# Patient Record
Sex: FEMALE | Race: WHITE | NOT HISPANIC OR LATINO | Employment: PART TIME | URBAN - METROPOLITAN AREA
[De-identification: names, ages, dates, MRNs, and addresses within clinical notes are randomized per-mention and may not be internally consistent; named-entity substitution may affect disease eponyms.]

---

## 2023-08-24 ENCOUNTER — HOSPITAL ENCOUNTER (OUTPATIENT)
Dept: DATA CONVERSION | Facility: HOSPITAL | Age: 23
Discharge: HOME | End: 2023-08-24
Payer: COMMERCIAL

## 2023-08-24 DIAGNOSIS — I10 ESSENTIAL (PRIMARY) HYPERTENSION: ICD-10-CM

## 2023-08-24 DIAGNOSIS — E78.5 HYPERLIPIDEMIA, UNSPECIFIED: ICD-10-CM

## 2023-08-24 DIAGNOSIS — F41.9 ANXIETY DISORDER, UNSPECIFIED: ICD-10-CM

## 2023-08-24 DIAGNOSIS — Z00.00 ENCOUNTER FOR GENERAL ADULT MEDICAL EXAMINATION WITHOUT ABNORMAL FINDINGS: ICD-10-CM

## 2023-08-24 LAB
25(OH)D3 SERPL-MCNC: 22 NG/ML (ref 31–100)
ALBUMIN SERPL-MCNC: 4.4 GM/DL (ref 3.5–5)
ALBUMIN/GLOB SERPL: 1.6 RATIO (ref 1.5–3)
ALP BLD-CCNC: 100 U/L (ref 35–125)
ALT SERPL-CCNC: 16 U/L (ref 5–40)
ANION GAP SERPL CALCULATED.3IONS-SCNC: 13 MMOL/L (ref 0–19)
APPEARANCE PLAS: CLEAR
AST SERPL-CCNC: 19 U/L (ref 5–40)
BASOPHILS # BLD AUTO: 0.05 K/UL (ref 0–0.22)
BASOPHILS NFR BLD AUTO: 0.6 % (ref 0–1)
BILIRUB SERPL-MCNC: 0.2 MG/DL (ref 0.1–1.2)
BUN SERPL-MCNC: 12 MG/DL (ref 8–25)
BUN/CREAT SERPL: 24 RATIO (ref 8–21)
CALCIUM SERPL-MCNC: 9.7 MG/DL (ref 8.5–10.4)
CHLORIDE SERPL-SCNC: 102 MMOL/L (ref 97–107)
CHOLEST SERPL-MCNC: 192 MG/DL (ref 133–200)
CHOLEST/HDLC SERPL: 4.3 RATIO
CO2 SERPL-SCNC: 25 MMOL/L (ref 24–31)
COLOR SPUN FLD: YELLOW
CREAT SERPL-MCNC: 0.5 MG/DL (ref 0.4–1.6)
DEPRECATED RDW RBC AUTO: 43 FL (ref 37–54)
DIFFERENTIAL METHOD BLD: ABNORMAL
EOSINOPHIL # BLD AUTO: 0.26 K/UL (ref 0–0.45)
EOSINOPHIL NFR BLD: 3 % (ref 0–3)
ERYTHROCYTE [DISTWIDTH] IN BLOOD BY AUTOMATED COUNT: 15.9 % (ref 11.7–15)
FASTING STATUS PATIENT QL REPORTED: ABNORMAL
GFR SERPL CREATININE-BSD FRML MDRD: 136 ML/MIN/1.73 M2
GLOBULIN SER-MCNC: 2.8 G/DL (ref 1.9–3.7)
GLUCOSE SERPL-MCNC: 99 MG/DL (ref 65–99)
HCT VFR BLD AUTO: 40.2 % (ref 36–44)
HDLC SERPL-MCNC: 45 MG/DL
HGB BLD-MCNC: 12.4 GM/DL (ref 12–15)
IMM GRANULOCYTES # BLD AUTO: 0.03 K/UL (ref 0–0.1)
LDLC SERPL CALC-MCNC: 121 MG/DL (ref 65–130)
LYMPHOCYTES # BLD AUTO: 2.46 K/UL (ref 1.2–3.2)
LYMPHOCYTES NFR BLD MANUAL: 28.6 % (ref 20–40)
MCH RBC QN AUTO: 23.4 PG (ref 26–34)
MCHC RBC AUTO-ENTMCNC: 30.8 % (ref 31–37)
MCV RBC AUTO: 75.8 FL (ref 80–100)
MONOCYTES # BLD AUTO: 0.43 K/UL (ref 0–0.8)
MONOCYTES NFR BLD MANUAL: 5 % (ref 0–8)
NEUTROPHILS # BLD AUTO: 5.36 K/UL
NEUTROPHILS # BLD AUTO: 5.36 K/UL (ref 1.8–7.7)
NEUTROPHILS.IMMATURE NFR BLD: 0.3 % (ref 0–1)
NEUTS SEG NFR BLD: 62.5 % (ref 50–70)
NRBC BLD-RTO: 0 /100 WBC
PLATELET # BLD AUTO: 334 K/UL (ref 150–450)
PMV BLD AUTO: 9.9 CU (ref 7–12.6)
POTASSIUM SERPL-SCNC: 4.3 MMOL/L (ref 3.4–5.1)
PROT SERPL-MCNC: 7.2 G/DL (ref 5.9–7.9)
RBC # BLD AUTO: 5.3 M/UL (ref 4–4.9)
SODIUM SERPL-SCNC: 140 MMOL/L (ref 133–145)
TRIGL SERPL-MCNC: 131 MG/DL (ref 40–150)
TSH SERPL DL<=0.05 MIU/L-ACNC: 3.44 MIU/L (ref 0.27–4.2)
WBC # BLD AUTO: 8.6 K/UL (ref 4.5–11)

## 2023-09-16 VITALS
HEIGHT: 68 IN | WEIGHT: 293 LBS | SYSTOLIC BLOOD PRESSURE: 138 MMHG | TEMPERATURE: 97.8 F | DIASTOLIC BLOOD PRESSURE: 86 MMHG | OXYGEN SATURATION: 96 % | BODY MASS INDEX: 44.41 KG/M2 | HEART RATE: 75 BPM

## 2023-09-17 PROBLEM — N92.1 MENORRHAGIA WITH IRREGULAR CYCLE: Status: ACTIVE | Noted: 2023-09-17

## 2023-09-17 PROBLEM — M54.32 LEFT SIDED SCIATICA: Status: ACTIVE | Noted: 2023-09-17

## 2023-09-17 PROBLEM — E66.01 MORBID (SEVERE) OBESITY DUE TO EXCESS CALORIES (MULTI): Status: ACTIVE | Noted: 2023-09-17

## 2023-09-17 PROBLEM — E78.5 DYSLIPIDEMIA: Status: ACTIVE | Noted: 2023-09-17

## 2023-09-17 PROBLEM — F41.8 ANXIETY WITH DEPRESSION: Status: ACTIVE | Noted: 2023-09-17

## 2023-09-17 PROBLEM — I10 ESSENTIAL HYPERTENSION: Status: ACTIVE | Noted: 2023-09-17

## 2023-09-17 PROBLEM — J32.0 MAXILLARY SINUSITIS: Status: ACTIVE | Noted: 2023-09-17

## 2023-09-17 PROBLEM — N93.9 ABNORMAL VAGINAL BLEEDING: Status: ACTIVE | Noted: 2023-09-17

## 2023-09-17 PROBLEM — F32.A DEPRESSION: Status: ACTIVE | Noted: 2023-09-17

## 2023-09-17 PROBLEM — F90.9 ATTENTION DEFICIT HYPERACTIVITY DISORDER (ADHD): Status: ACTIVE | Noted: 2023-09-17

## 2023-09-17 PROBLEM — H91.91 HEARING LOSS OF RIGHT EAR: Status: ACTIVE | Noted: 2023-09-17

## 2023-09-17 PROBLEM — K29.60 REFLUX GASTRITIS: Status: ACTIVE | Noted: 2023-09-17

## 2023-09-17 PROBLEM — E78.5 HYPERLIPIDEMIA: Status: ACTIVE | Noted: 2023-09-17

## 2023-09-17 PROBLEM — E55.9 VITAMIN D DEFICIENCY: Status: ACTIVE | Noted: 2023-09-17

## 2023-09-17 RX ORDER — ENALAPRIL MALEATE 20 MG/1
20 TABLET ORAL DAILY
COMMUNITY
End: 2023-10-26 | Stop reason: SDUPTHER

## 2023-09-17 RX ORDER — FLUOXETINE HYDROCHLORIDE 40 MG/1
1 CAPSULE ORAL DAILY
COMMUNITY
Start: 2020-08-27 | End: 2023-11-24 | Stop reason: ALTCHOICE

## 2023-09-17 RX ORDER — GUAIFENESIN 1200 MG
325 TABLET, EXTENDED RELEASE 12 HR ORAL EVERY 4 HOURS
COMMUNITY
End: 2024-02-23 | Stop reason: ALTCHOICE

## 2023-09-17 RX ORDER — ENALAPRIL MALEATE 2.5 MG/1
20 TABLET ORAL DAILY
COMMUNITY
Start: 2014-09-24 | End: 2023-11-24 | Stop reason: ALTCHOICE

## 2023-09-17 RX ORDER — CYCLOBENZAPRINE HCL 10 MG
10 TABLET ORAL NIGHTLY
COMMUNITY
Start: 2022-05-18 | End: 2023-11-24 | Stop reason: ALTCHOICE

## 2023-09-17 RX ORDER — NORETHINDRONE ACETATE AND ETHINYL ESTRADIOL, ETHINYL ESTRADIOL AND FERROUS FUMARATE 1MG-10(24)
1 KIT ORAL DAILY
COMMUNITY
End: 2023-11-24 | Stop reason: ALTCHOICE

## 2023-09-17 RX ORDER — HYDROCHLOROTHIAZIDE 25 MG/1
25 TABLET ORAL DAILY
COMMUNITY
End: 2023-10-26 | Stop reason: SDUPTHER

## 2023-09-17 RX ORDER — DEXTROAMPHETAMINE SULFATE, DEXTROAMPHETAMINE SACCHARATE, AMPHETAMINE SULFATE AND AMPHETAMINE ASPARTATE 7.5; 7.5; 7.5; 7.5 MG/1; MG/1; MG/1; MG/1
30 CAPSULE, EXTENDED RELEASE ORAL
COMMUNITY
Start: 2021-06-16 | End: 2023-11-24 | Stop reason: ALTCHOICE

## 2023-09-17 RX ORDER — CLONAZEPAM 0.5 MG/1
0.5 TABLET ORAL NIGHTLY
COMMUNITY
End: 2023-11-24 | Stop reason: ALTCHOICE

## 2023-09-17 RX ORDER — ATENOLOL 50 MG/1
50 TABLET ORAL DAILY
COMMUNITY
Start: 2023-08-14 | End: 2023-10-24 | Stop reason: SDUPTHER

## 2023-09-17 RX ORDER — ATORVASTATIN CALCIUM 20 MG/1
20 TABLET, FILM COATED ORAL DAILY
COMMUNITY
End: 2023-11-28 | Stop reason: ALTCHOICE

## 2023-10-13 ENCOUNTER — OFFICE VISIT (OUTPATIENT)
Dept: SURGERY | Facility: CLINIC | Age: 23
End: 2023-10-13
Payer: COMMERCIAL

## 2023-10-13 VITALS
DIASTOLIC BLOOD PRESSURE: 74 MMHG | SYSTOLIC BLOOD PRESSURE: 141 MMHG | HEIGHT: 68 IN | HEART RATE: 73 BPM | WEIGHT: 293 LBS | BODY MASS INDEX: 44.41 KG/M2

## 2023-10-13 DIAGNOSIS — R40.0 DAYTIME SOMNOLENCE: ICD-10-CM

## 2023-10-13 DIAGNOSIS — I10 ESSENTIAL HYPERTENSION: ICD-10-CM

## 2023-10-13 DIAGNOSIS — E66.01 MORBID OBESITY WITH BMI OF 50.0-59.9, ADULT (MULTI): Primary | ICD-10-CM

## 2023-10-13 DIAGNOSIS — M54.32 LEFT SIDED SCIATICA: ICD-10-CM

## 2023-10-13 DIAGNOSIS — K21.9 GASTROESOPHAGEAL REFLUX DISEASE, UNSPECIFIED WHETHER ESOPHAGITIS PRESENT: ICD-10-CM

## 2023-10-13 DIAGNOSIS — E78.5 HYPERLIPIDEMIA, UNSPECIFIED HYPERLIPIDEMIA TYPE: ICD-10-CM

## 2023-10-13 PROCEDURE — 99204 OFFICE O/P NEW MOD 45 MIN: CPT | Performed by: SURGERY

## 2023-10-13 PROCEDURE — 3077F SYST BP >= 140 MM HG: CPT | Performed by: SURGERY

## 2023-10-13 PROCEDURE — 1036F TOBACCO NON-USER: CPT | Performed by: SURGERY

## 2023-10-13 PROCEDURE — 3008F BODY MASS INDEX DOCD: CPT | Performed by: SURGERY

## 2023-10-13 PROCEDURE — 3078F DIAST BP <80 MM HG: CPT | Performed by: SURGERY

## 2023-10-13 RX ORDER — IBUPROFEN 200 MG
200 TABLET ORAL EVERY 6 HOURS
COMMUNITY
End: 2024-02-23 | Stop reason: ALTCHOICE

## 2023-10-13 ASSESSMENT — COLUMBIA-SUICIDE SEVERITY RATING SCALE - C-SSRS
1. IN THE PAST MONTH, HAVE YOU WISHED YOU WERE DEAD OR WISHED YOU COULD GO TO SLEEP AND NOT WAKE UP?: NO
6. HAVE YOU EVER DONE ANYTHING, STARTED TO DO ANYTHING, OR PREPARED TO DO ANYTHING TO END YOUR LIFE?: NO
2. HAVE YOU ACTUALLY HAD ANY THOUGHTS OF KILLING YOURSELF?: NO

## 2023-10-13 ASSESSMENT — ENCOUNTER SYMPTOMS
DEPRESSION: 0
OCCASIONAL FEELINGS OF UNSTEADINESS: 0
LOSS OF SENSATION IN FEET: 0

## 2023-10-13 ASSESSMENT — PAIN SCALES - GENERAL: PAINLEVEL: 0-NO PAIN

## 2023-10-13 ASSESSMENT — LIFESTYLE VARIABLES
AUDIT-C TOTAL SCORE: 1
HOW OFTEN DO YOU HAVE A DRINK CONTAINING ALCOHOL: MONTHLY OR LESS
SKIP TO QUESTIONS 9-10: 1
AUDIT-C TOTAL SCORE: 1
HOW OFTEN DO YOU HAVE A DRINK CONTAINING ALCOHOL: MONTHLY OR LESS
HOW OFTEN DO YOU HAVE SIX OR MORE DRINKS ON ONE OCCASION: NEVER
HOW MANY STANDARD DRINKS CONTAINING ALCOHOL DO YOU HAVE ON A TYPICAL DAY: 1 OR 2
HOW MANY STANDARD DRINKS CONTAINING ALCOHOL DO YOU HAVE ON A TYPICAL DAY: 1 OR 2
SKIP TO QUESTIONS 9-10: 1
HOW OFTEN DO YOU HAVE SIX OR MORE DRINKS ON ONE OCCASION: NEVER

## 2023-10-13 ASSESSMENT — PATIENT HEALTH QUESTIONNAIRE - PHQ9
1. LITTLE INTEREST OR PLEASURE IN DOING THINGS: NOT AT ALL
2. FEELING DOWN, DEPRESSED OR HOPELESS: NOT AT ALL
SUM OF ALL RESPONSES TO PHQ9 QUESTIONS 1 AND 2: 0

## 2023-10-13 NOTE — PROGRESS NOTES
Subjective   Patient ID: Shravan Astudillo is a 22 y.o. female who presents for New Patient Visit (Referral Dr. Casper/Bariatric consult).  HPI  START WT: 366   IDEAL WT: 164    START EXCESS:  202          Review of Systems     Allergy/Immunologic:          HIV / AIDS No.  Hepatitis A No.  Hepatitis B No.  Hepatitis C No.  Immunosuppressent drugs yes.         HEENT:          Headache negative.         CARDIOLOGY:          History of Hyperlipidemia yes.  Last stress test N/A.  Last echocardiogram N/A.  Chest pain No.  High blood pressure yes  Irregular heart beat No.  Known coronary artery disease No.  Pacemaker No.  Palpitations No.         RESPIRATORY:          Hx steroid use yes.  ER visits or Hospitalizations for breathing problems No.  Sleep Apnea No.  BURR (dyspnea on exertion) No.  Hx of Asthma/COPD No.         GASTROENTEROLOGY:          Peptic ulcer No, Last EGD N/A, Last UGI N/A.  Colonoscopy Last Colonoscopy N/A.  Heartburn yes         ENDOCRINOLOGY:          Diabetes No.  Thyroid disorder No.         EXTREMITIES:          Varicose Veins No.  Stasis Ulcers No.  Ankle swelling No.  Personal history DVT No.  Personal history PE No.  Personal history of other thrombolic events No.  Family history of VTE No.  Known genetic bleeding or clotting disorder No.         FEMALE REPRODUCTIVE:          Uterine fibroids No.  Ovarian Cyst No.  Infertility No.  Menstrual history Regular menstrual periods         UROLOGY:          Kidney disease No.  Kidney stones No.  Previous UTIs No.  Urinary incontinence No.         MUSCULOSKELETAL:          Osteoporosis/Osteopenia No.  Arthritis No.  Joint pain yes, sciatic nerve pain        SKIN:          Hidradenitis No.  Open skin wounds No.  Rosacea No.  Healing problems No.         PSYCHOLOGY:          Anxiety yes.  Depression yes  Eating disorder denies.  admits adhd  Objective   Physical Exam    Assessment/Plan

## 2023-10-15 PROBLEM — K21.9 GASTROESOPHAGEAL REFLUX DISEASE: Status: ACTIVE | Noted: 2023-10-15

## 2023-10-15 PROBLEM — R40.0 DAYTIME SOMNOLENCE: Status: ACTIVE | Noted: 2023-10-15

## 2023-10-15 NOTE — PROGRESS NOTES
I failed to mention prior to locking the note:  We discussed discharge on lovenox after surgery to reduce risk of perioperative VTE based on BMI greater than 50.  We discussed that our recommendation is to avoid conception for at least 18 months after surgery and ensure labwork is normal and that she is on prenatal vitamin at time of conception.  We discussed that fertility will likely improve and rapidly with weight loss.

## 2023-10-15 NOTE — H&P
History Of Present Illness  Shravan Astudillo is a 22 y.o. female here for consultation for bariatric surgery. She suffers from morbid obesity and has been overweight for many years and has a number of weight related comorbidities. She has attempted to lose weight several times over the years. She has had successes but has regained the weight at the completion of each of her dieting attempts. She is being referred for surgical intervention for her clinically significant morbid obesity.       Past Medical History  Past Medical History:   Diagnosis Date    Acid reflux     ADHD     Anxiety     Depression     HLD (hyperlipidemia)     HTN (hypertension)     Other specified health status     No known health problems       Surgical History  No past surgical history on file.     Social History  She reports that she has never smoked. She has never used smokeless tobacco. She reports current alcohol use. She reports that she does not use drugs.    Family History  Family History   Problem Relation Name Age of Onset    Endometriosis Mother      Drug abuse Mother      Other (morbid obesity) Father      Other (htn) Father      Diabetes Sister      Polycystic ovary syndrome Sister      Endometriosis Maternal Grandmother          Allergies  Patient has no known allergies.    Review of Systems   START WT: 366   IDEAL WT: 164    START EXCESS:  202          Review of Systems     Allergy/Immunologic:          HIV / AIDS No.  Hepatitis A No.  Hepatitis B No.  Hepatitis C No.  Immunosuppressent drugs yes.         HEENT:          Headache negative.         CARDIOLOGY:          History of Hyperlipidemia yes.  Last stress test N/A.  Last echocardiogram N/A.  Chest pain No.  High blood pressure yes  Irregular heart beat No.  Known coronary artery disease No.  Pacemaker No.  Palpitations No.         RESPIRATORY:          Hx steroid use yes.  ER visits or Hospitalizations for breathing problems No.  Sleep Apnea No.  BURR (dyspnea on exertion)  "No.  Hx of Asthma/COPD No.         GASTROENTEROLOGY:          Peptic ulcer No, Last EGD N/A, Last UGI N/A.  Colonoscopy Last Colonoscopy N/A.  Heartburn yes         ENDOCRINOLOGY:          Diabetes No.  Thyroid disorder No.         EXTREMITIES:          Varicose Veins No.  Stasis Ulcers No.  Ankle swelling No.  Personal history DVT No.  Personal history PE No.  Personal history of other thrombolic events No.  Family history of VTE No.  Known genetic bleeding or clotting disorder No.         FEMALE REPRODUCTIVE:          Uterine fibroids No.  Ovarian Cyst No.  Infertility No.  Menstrual history Regular menstrual periods         UROLOGY:          Kidney disease No.  Kidney stones No.  Previous UTIs No.  Urinary incontinence No.         MUSCULOSKELETAL:          Osteoporosis/Osteopenia No.  Arthritis No.  Joint pain yes, sciatic nerve pain        SKIN:          Hidradenitis No.  Open skin wounds No.  Rosacea No.  Healing problems No.         PSYCHOLOGY:          Anxiety yes.  Depression yes  Eating disorder denies.  admits adhd        Physical Exam    General Examination:         GENERAL APPEARANCE: alert and oriented x 3, Pleasant and cooperative, No Acute Distress.          HEENT: PERRLA, good dentition.          NECK: no lymphadenopathy, no thyromegaly, no JVD, normal flexion, normal extension.          HEART: regular rate and rhythm.          LUNGS: clear to auscultation bilaterally.          CHEST: normal shape and expansion.          ABDOMEN: obese, no hernias present, soft and not tender, no guarding, no CVA tenderness.          EXTREMITIES: pulses 2 plus bilaterally, trace bilateral edema, no ulcerations.          SKIN: normal, no rash.          NEUROLOGIC EXAM: CN's II-XII grossly intact, gait normal.          BACK: no CVA tenderness.        Last Recorded Vitals  Blood pressure 141/74, pulse 73, height 1.721 m (5' 7.75\"), weight (!) 166 kg (366 lb).    Relevant Results     Assessment/Plan   Problem List Items " Addressed This Visit             ICD-10-CM       Cardiac and Vasculature    Essential hypertension I10    Hyperlipidemia E78.5       Endocrine/Metabolic    Morbid obesity with BMI of 50.0-59.9, adult (CMS/[facility]) - Primary E66.01, Z68.43       Gastrointestinal and Abdominal    Gastroesophageal reflux disease K21.9       Musculoskeletal and Injuries    Left sided sciatica M54.32       Neuro    Daytime somnolence R40.0       We spent 45 minutes reviewing the three surgical options available in the treatment of morbid obesity in our practice. We reviewed the laparoscopic approach to the Khang-en-Y gastric bypass, the vertical sleeve gastrectomy, and the adjustable gastric band. We reviewed the surgical technique, the risks, and my complication rates. We also reviewed the expected weight loss, the rules necessary for alvarado-term success, the nutritional supplementation recommended for each operation, and the importance of incorporating excercise into the lifestyle to maintain the weight. We reviewed both the national history with each operation, my experience with each operation, the lack of long-term weight loss data with the vertical sleeve gastrectomy and the potential for exacerbating reflux with the vertical sleeve gastrectomy. In addition, we discussed the risk of adhesions, internal hernias, iron and calcium deficiency, dumping syndrome and potential for gastrojejunal ulcer with the RYGB. [place] would like to proceed with vertical sleeve gastrectomy.  We discussed that RYGB is a better choice for GERD resolution.  We discussed that I will evaluate for presence of hiatal hernia at time of surgery and if appropriate repair at surgery.  We will defer management of hypertension and hyperlipidemia to Dr. Casper.  I would like [place] to obtain sleep study and lose 20 pounds prior to surgery as physical exam is consistent with hepatomegaly.       I spent 45 minutes in the professional and overall care of this patient.      Blaze  MD Vikas

## 2023-10-24 DIAGNOSIS — I10 ESSENTIAL HYPERTENSION: ICD-10-CM

## 2023-10-24 RX ORDER — ATENOLOL 50 MG/1
50 TABLET ORAL DAILY
Qty: 30 TABLET | Refills: 1 | Status: SHIPPED | OUTPATIENT
Start: 2023-10-24 | End: 2023-12-13 | Stop reason: SDUPTHER

## 2023-10-26 DIAGNOSIS — I10 ESSENTIAL HYPERTENSION: Primary | ICD-10-CM

## 2023-10-26 RX ORDER — HYDROCHLOROTHIAZIDE 25 MG/1
25 TABLET ORAL DAILY
Qty: 90 TABLET | Refills: 1 | Status: SHIPPED | OUTPATIENT
Start: 2023-10-26 | End: 2023-12-13 | Stop reason: SDUPTHER

## 2023-10-26 RX ORDER — ENALAPRIL MALEATE 20 MG/1
20 TABLET ORAL DAILY
Qty: 90 TABLET | Refills: 1 | Status: SHIPPED | OUTPATIENT
Start: 2023-10-26 | End: 2023-12-13 | Stop reason: SDUPTHER

## 2023-10-31 ENCOUNTER — OFFICE VISIT (OUTPATIENT)
Dept: SURGERY | Facility: CLINIC | Age: 23
End: 2023-10-31
Payer: COMMERCIAL

## 2023-10-31 ENCOUNTER — NUTRITION (OUTPATIENT)
Dept: SURGERY | Facility: CLINIC | Age: 23
End: 2023-10-31
Payer: COMMERCIAL

## 2023-10-31 VITALS — WEIGHT: 293 LBS | BODY MASS INDEX: 55.76 KG/M2

## 2023-10-31 VITALS
DIASTOLIC BLOOD PRESSURE: 88 MMHG | WEIGHT: 293 LBS | HEIGHT: 68 IN | HEART RATE: 63 BPM | SYSTOLIC BLOOD PRESSURE: 143 MMHG | BODY MASS INDEX: 44.41 KG/M2

## 2023-10-31 DIAGNOSIS — E51.9 THIAMINE DEFICIENCY: ICD-10-CM

## 2023-10-31 DIAGNOSIS — E55.9 VITAMIN D DEFICIENCY: ICD-10-CM

## 2023-10-31 DIAGNOSIS — D50.8 IRON DEFICIENCY ANEMIA SECONDARY TO INADEQUATE DIETARY IRON INTAKE: ICD-10-CM

## 2023-10-31 DIAGNOSIS — Z71.3 ENCOUNTER FOR NUTRITION EVALUATION PRIOR TO BARIATRIC SURGERY: ICD-10-CM

## 2023-10-31 DIAGNOSIS — E07.9 DISEASE OF THYROID GLAND: ICD-10-CM

## 2023-10-31 DIAGNOSIS — E61.0 COPPER DEFICIENCY: ICD-10-CM

## 2023-10-31 DIAGNOSIS — I10 PRIMARY HYPERTENSION: Primary | ICD-10-CM

## 2023-10-31 DIAGNOSIS — D68.9 COAGULATION DISORDER (MULTI): ICD-10-CM

## 2023-10-31 DIAGNOSIS — G47.33 OBSTRUCTIVE SLEEP APNEA: ICD-10-CM

## 2023-10-31 DIAGNOSIS — R73.9 HYPERGLYCEMIA: ICD-10-CM

## 2023-10-31 DIAGNOSIS — E63.9 NUTRITIONAL DISORDER: ICD-10-CM

## 2023-10-31 DIAGNOSIS — E53.8 VITAMIN B12 DEFICIENCY: ICD-10-CM

## 2023-10-31 PROCEDURE — 3079F DIAST BP 80-89 MM HG: CPT | Performed by: INTERNAL MEDICINE

## 2023-10-31 PROCEDURE — 3008F BODY MASS INDEX DOCD: CPT | Performed by: INTERNAL MEDICINE

## 2023-10-31 PROCEDURE — 3077F SYST BP >= 140 MM HG: CPT | Performed by: INTERNAL MEDICINE

## 2023-10-31 PROCEDURE — 99215 OFFICE O/P EST HI 40 MIN: CPT | Performed by: INTERNAL MEDICINE

## 2023-10-31 PROCEDURE — 1036F TOBACCO NON-USER: CPT | Performed by: INTERNAL MEDICINE

## 2023-10-31 RX ORDER — DOXYCYCLINE HYCLATE 100 MG
100 TABLET ORAL 2 TIMES DAILY
COMMUNITY
End: 2023-11-24 | Stop reason: ALTCHOICE

## 2023-10-31 RX ORDER — BENZONATATE 200 MG/1
200 CAPSULE ORAL 2 TIMES DAILY PRN
COMMUNITY
End: 2023-11-24 | Stop reason: ALTCHOICE

## 2023-10-31 RX ORDER — METHYLPREDNISOLONE 4 MG/1
4 TABLET ORAL DAILY
COMMUNITY
End: 2023-11-24 | Stop reason: ALTCHOICE

## 2023-10-31 ASSESSMENT — ENCOUNTER SYMPTOMS
SHORTNESS OF BREATH: 1
SLEEP DISTURBANCE: 0
DIZZINESS: 0
ARTHRALGIAS: 0
ROS GI COMMENTS: HEARTBURN
FEVER: 0
ABDOMINAL PAIN: 0
WEAKNESS: 0
DIFFICULTY URINATING: 0
COUGH: 1
FATIGUE: 0
JOINT SWELLING: 0
RHINORRHEA: 0
VOMITING: 0
HEADACHES: 0
SINUS PAIN: 0
HEMATURIA: 0
CONSTIPATION: 0
SORE THROAT: 0
CHILLS: 0
PALPITATIONS: 0
NERVOUS/ANXIOUS: 0
OCCASIONAL FEELINGS OF UNSTEADINESS: 0
DEPRESSION: 0
LOSS OF SENSATION IN FEET: 0
FREQUENCY: 0
APPETITE CHANGE: 0
NAUSEA: 0
DIARRHEA: 0

## 2023-10-31 ASSESSMENT — PATIENT HEALTH QUESTIONNAIRE - PHQ9
1. LITTLE INTEREST OR PLEASURE IN DOING THINGS: NOT AT ALL
2. FEELING DOWN, DEPRESSED OR HOPELESS: NOT AT ALL
SUM OF ALL RESPONSES TO PHQ9 QUESTIONS 1 AND 2: 0
SUM OF ALL RESPONSES TO PHQ9 QUESTIONS 1 AND 2: 0
1. LITTLE INTEREST OR PLEASURE IN DOING THINGS: NOT AT ALL
2. FEELING DOWN, DEPRESSED OR HOPELESS: NOT AT ALL

## 2023-10-31 ASSESSMENT — COLUMBIA-SUICIDE SEVERITY RATING SCALE - C-SSRS
2. HAVE YOU ACTUALLY HAD ANY THOUGHTS OF KILLING YOURSELF?: NO
6. HAVE YOU EVER DONE ANYTHING, STARTED TO DO ANYTHING, OR PREPARED TO DO ANYTHING TO END YOUR LIFE?: NO
1. IN THE PAST MONTH, HAVE YOU WISHED YOU WERE DEAD OR WISHED YOU COULD GO TO SLEEP AND NOT WAKE UP?: NO

## 2023-10-31 ASSESSMENT — LIFESTYLE VARIABLES
AUDIT-C TOTAL SCORE: 1
HOW MANY STANDARD DRINKS CONTAINING ALCOHOL DO YOU HAVE ON A TYPICAL DAY: 1 OR 2
HOW OFTEN DO YOU HAVE SIX OR MORE DRINKS ON ONE OCCASION: NEVER
SKIP TO QUESTIONS 9-10: 1
HOW OFTEN DO YOU HAVE A DRINK CONTAINING ALCOHOL: MONTHLY OR LESS

## 2023-10-31 ASSESSMENT — PAIN SCALES - GENERAL: PAINLEVEL: 0-NO PAIN

## 2023-10-31 NOTE — PROGRESS NOTES
Initial Medical Weight Loss Appointment:     Starting Weight: 366 lbs   Current Weight: 364 lbs   Current BMI:  55.76 (class 3 obesity)     Shravan's  - Seferino was present at initial appointment     Diet Experience: Shravan tells me that she has struggled with overeating and obesity for years. She states that she saw dietitian when she was a child. She tried switching to low fat dairy products, reducing portions, increasing protein and eating more vegetables. At one point she would meal prep with her cousin on Sundays. She also tried tracking her calories and staying within 5104-2696 calories per day.   Shravan reports that her current weight is the highest weight that she is aware of.   Pt Seeking: sleeve    Pertinent Co-morbidities: htn - she is on medication   Current Daily Intake:   Breakfast- Normally does not eat breakfast. When she was meal prepping she would make overnight oats using 1/2-1 cup of plain rolled oats, frank seeds, fruit and sometimes PB2 powder. OR an egg and meat. Sometimes will have a Egg mcmuffin SW at [employer] but she takes the bun off.   Lunch & Dinner - typically has 1-2 meals at [employer] Sometimes takeout on her way home from work. Or may have whatever her  or mom prepares. Varies.   How many times do you order takeout, fast food and/or go out to eat per week? Often   Snacks: does not have much snacks throughout the day, sometimes will have at night    Beverages: Mostly water, a coffee with cream and sugar 3days per week , tea at work, pop once a week  Alcohol Intake: once a month   Food Allergies? None mentioned   Food Intolerances? None mentioned   Food Dislikes? Fish, doesn't care for pork  Do you eat when you are not hungry and/or when you feel full? Yes   Do you eat when you are bored/stressed/emotional?  Current Exercise/Exercise Hx: Sometimes walks her dog outside   Hours of sleep per night: 8 hours on average, tends to oversleep per pt   Work schedule: Works 4 days  per week at [employer]. 10 hr shifts.   Who is in the household?   Who does the cooking/grocery shopping?  She does the grocery shopping and some cooking   Obstacles to weight loss in the past? Struggles with overeating. Shravan tells me that she has a hard time distinguishing when she feels full. She reports that she does tend to eat very fast, and often feels overly full afterwards. She reports that she has struggled with this for years.   Anything else relative to diet? Protein sources that she likes include : chicken, cottage cheese, bri mccallum jerky and links, eggs   Typical meal times listed   B (before 7-7:30am) - if she has   L (varies btw 10:30am-4pm)   D (6:30pm)   Goes to sleep around 10pm   What do you want to get out of surgery? Wants to be healthy   Motivation to change (1-10):  6-7       Estimated ability to achieve goals: good     Today's Lesson: Review of Dr. Bean's lifelong rules, calorie counting, food label reading, promoting feelings of satiety, and energy balance.  Example meal options and handouts provided   Diet Goals: Practice the lifelong rules  Exercise Recommendations: Gradually work up to 150 minutes of moderate intensity exercise per week.  Behavior Changes: will be assessed every month    Goals for the month:   Start to walk / exercise at least 3 days per week   Incorporate 3 meals per day   Practice slowing down when eating     Plan: Will follow up next month. Will continue to monitor pt's weight, dietary & behavior changes.      Tiffanie Moeller, MS, RD, LD

## 2023-10-31 NOTE — PROGRESS NOTES
Subjective   Patient ID: Shravan Astudillo is a 22 y.o. female who presents for weight issues. Trying to lose weight for many years by following different diets like Weight Watchers, lost some weight but did not maintain. Currently has 2 meals a day. Does not eat breakfast. Lunch consists of eating fast food or leftovers. For dinner, she makes pasta. Does not normally snack. Drinks water and coffee w/ cream and sugar 3x a week, sweetened tea. Regarding exercise, she walks. Reports having prior EKG and history of hypertension. She monitors her BP at home and reports readings in the 130's. Denies personal and family hx of blood clots. Started taking vit D supplement. Patient is on antibiotics for pneumonia and still endorses shortness of breath, cough, and yellow sputum. C/o heartburn and takes Tums.    Diagnostics Reviewed: 10/2023 EKG NSR, inverted T wave in V1  Labs Reviewed: 8/2023 vit D 22 , TSH 3.4, lipid nml CBC nml, CMP nml        Review of Systems   Constitutional:  Negative for appetite change, chills, fatigue and fever.   HENT:  Negative for ear pain, rhinorrhea, sinus pain and sore throat.    Eyes:  Negative for visual disturbance.   Respiratory:  Positive for cough (yellow sputum) and shortness of breath.    Cardiovascular:  Negative for chest pain and palpitations.   Gastrointestinal:  Negative for abdominal pain, constipation, diarrhea, nausea and vomiting.        Heartburn   Genitourinary:  Negative for difficulty urinating, frequency and hematuria.   Musculoskeletal:  Negative for arthralgias and joint swelling.   Skin:  Negative for rash.   Neurological:  Negative for dizziness, weakness and headaches.   Psychiatric/Behavioral:  Negative for sleep disturbance. The patient is not nervous/anxious.        Objective   Physical Exam  Constitutional:       General: She is not in acute distress.     Appearance: She is obese.   HENT:      Mouth/Throat:      Comments: Dentition WNL  Cardiovascular:      Rate  and Rhythm: Normal rate and regular rhythm.      Heart sounds: Normal heart sounds.   Pulmonary:      Breath sounds: Normal breath sounds.   Abdominal:      Palpations: Abdomen is soft.      Tenderness: There is no abdominal tenderness.   Musculoskeletal:      Cervical back: Neck supple. No tenderness.      Right lower leg: No edema.      Left lower leg: No edema.   Lymphadenopathy:      Cervical: No cervical adenopathy.   Skin:     General: Skin is warm.      Findings: No erythema.   Neurological:      General: No focal deficit present.      Mental Status: She is alert and oriented to person, place, and time.      Gait: Gait is intact.   Psychiatric:         Mood and Affect: Mood normal.         Behavior: Behavior normal.         Assessment/Plan   Diagnoses and all orders for this visit:  Primary hypertension  -     17-Hydroxyprogesterone; Future  Coagulation disorder (CMS/HCC)  -     Vitamin B12; Future  -     Parathyroid Hormone, Intact; Future  -     Vitamin B1, Whole Blood; Future  -     Copper, Blood; Future  -     Vitamin A; Future  -     Vitamin E; Future  -     Zinc, Serum or Plasma; Future  -     Iron and TIBC; Future  -     H. Pylori Antigen, Stool; Future  -     aPTT; Future  -     Hemoglobin A1C; Future  -     Folate; Future  -     Ferritin; Future  -     Protime-INR; Future  Copper deficiency  -     Vitamin B12; Future  -     Parathyroid Hormone, Intact; Future  -     Vitamin B1, Whole Blood; Future  -     Copper, Blood; Future  -     Vitamin A; Future  -     Vitamin E; Future  -     Zinc, Serum or Plasma; Future  -     Iron and TIBC; Future  -     H. Pylori Antigen, Stool; Future  -     aPTT; Future  -     Hemoglobin A1C; Future  -     Folate; Future  -     Ferritin; Future  -     Protime-INR; Future  Encounter for nutrition evaluation prior to bariatric surgery  -     Vitamin B12; Future  -     Parathyroid Hormone, Intact; Future  -     Vitamin B1, Whole Blood; Future  -     Copper, Blood; Future  -      Vitamin A; Future  -     Vitamin E; Future  -     Zinc, Serum or Plasma; Future  -     Iron and TIBC; Future  -     H. Pylori Antigen, Stool; Future  -     aPTT; Future  -     Hemoglobin A1C; Future  -     Folate; Future  -     Ferritin; Future  -     Protime-INR; Future  Hyperglycemia  -     Vitamin B12; Future  -     Parathyroid Hormone, Intact; Future  -     Vitamin B1, Whole Blood; Future  -     Copper, Blood; Future  -     Vitamin A; Future  -     Vitamin E; Future  -     Zinc, Serum or Plasma; Future  -     Iron and TIBC; Future  -     H. Pylori Antigen, Stool; Future  -     aPTT; Future  -     Hemoglobin A1C; Future  -     Folate; Future  -     Ferritin; Future  -     Protime-INR; Future  Iron deficiency anemia secondary to inadequate dietary iron intake  -     Vitamin B12; Future  -     Parathyroid Hormone, Intact; Future  -     Vitamin B1, Whole Blood; Future  -     Copper, Blood; Future  -     Vitamin A; Future  -     Vitamin E; Future  -     Zinc, Serum or Plasma; Future  -     Iron and TIBC; Future  -     H. Pylori Antigen, Stool; Future  -     aPTT; Future  -     Hemoglobin A1C; Future  -     Folate; Future  -     Ferritin; Future  -     Protime-INR; Future  Nutritional disorder  -     Vitamin B12; Future  -     Parathyroid Hormone, Intact; Future  -     Vitamin B1, Whole Blood; Future  -     Copper, Blood; Future  -     Vitamin A; Future  -     Vitamin E; Future  -     Zinc, Serum or Plasma; Future  -     Iron and TIBC; Future  -     H. Pylori Antigen, Stool; Future  -     aPTT; Future  -     Hemoglobin A1C; Future  -     Folate; Future  -     Ferritin; Future  -     Protime-INR; Future  Obstructive sleep apnea  -     Vitamin B12; Future  -     Parathyroid Hormone, Intact; Future  -     Vitamin B1, Whole Blood; Future  -     Copper, Blood; Future  -     Vitamin A; Future  -     Vitamin E; Future  -     Zinc, Serum or Plasma; Future  -     Iron and TIBC; Future  -     H. Pylori Antigen, Stool;  Future  -     aPTT; Future  -     Hemoglobin A1C; Future  -     Folate; Future  -     Ferritin; Future  -     Protime-INR; Future  -     Home sleep apnea test (HSAT); Future  Thiamine deficiency  -     Vitamin B12; Future  -     Parathyroid Hormone, Intact; Future  -     Vitamin B1, Whole Blood; Future  -     Copper, Blood; Future  -     Vitamin A; Future  -     Vitamin E; Future  -     Zinc, Serum or Plasma; Future  -     Iron and TIBC; Future  -     H. Pylori Antigen, Stool; Future  -     aPTT; Future  -     Hemoglobin A1C; Future  -     Folate; Future  -     Ferritin; Future  -     Protime-INR; Future  Vitamin B12 deficiency  -     Vitamin B12; Future  -     Parathyroid Hormone, Intact; Future  -     Vitamin B1, Whole Blood; Future  -     Copper, Blood; Future  -     Vitamin A; Future  -     Vitamin E; Future  -     Zinc, Serum or Plasma; Future  -     Iron and TIBC; Future  -     H. Pylori Antigen, Stool; Future  -     aPTT; Future  -     Hemoglobin A1C; Future  -     Folate; Future  -     Ferritin; Future  -     Protime-INR; Future  Vitamin D deficiency  -     Vitamin B12; Future  -     Parathyroid Hormone, Intact; Future  -     Vitamin B1, Whole Blood; Future  -     Copper, Blood; Future  -     Vitamin A; Future  -     Vitamin E; Future  -     Zinc, Serum or Plasma; Future  -     Iron and TIBC; Future  -     H. Pylori Antigen, Stool; Future  -     aPTT; Future  -     Hemoglobin A1C; Future  -     Folate; Future  -     Ferritin; Future  -     Protime-INR; Future  Disease of thyroid gland  -     Vitamin B12; Future  -     Parathyroid Hormone, Intact; Future  -     Vitamin B1, Whole Blood; Future  -     Copper, Blood; Future  -     Vitamin A; Future  -     Vitamin E; Future  -     Zinc, Serum or Plasma; Future  -     Iron and TIBC; Future  -     H. Pylori Antigen, Stool; Future  -     aPTT; Future  -     Hemoglobin A1C; Future  -     Folate; Future  -     Ferritin; Future  -     Protime-INR; Future        Scribe  Attestation  By signing my name below, I, Dilshad Ribeiro   attest that this documentation has been prepared under the direction and in the presence of Arianna Osorio MD.

## 2023-11-07 ENCOUNTER — LAB (OUTPATIENT)
Dept: LAB | Facility: LAB | Age: 23
End: 2023-11-07
Payer: COMMERCIAL

## 2023-11-07 DIAGNOSIS — I10 PRIMARY HYPERTENSION: ICD-10-CM

## 2023-11-07 DIAGNOSIS — Z71.3 ENCOUNTER FOR NUTRITION EVALUATION PRIOR TO BARIATRIC SURGERY: ICD-10-CM

## 2023-11-07 DIAGNOSIS — E61.0 COPPER DEFICIENCY: ICD-10-CM

## 2023-11-07 DIAGNOSIS — E53.8 VITAMIN B12 DEFICIENCY: ICD-10-CM

## 2023-11-07 DIAGNOSIS — E51.9 THIAMINE DEFICIENCY: ICD-10-CM

## 2023-11-07 DIAGNOSIS — D50.8 IRON DEFICIENCY ANEMIA SECONDARY TO INADEQUATE DIETARY IRON INTAKE: ICD-10-CM

## 2023-11-07 DIAGNOSIS — E63.9 NUTRITIONAL DISORDER: ICD-10-CM

## 2023-11-07 DIAGNOSIS — E07.9 DISEASE OF THYROID GLAND: ICD-10-CM

## 2023-11-07 DIAGNOSIS — G47.33 OBSTRUCTIVE SLEEP APNEA: ICD-10-CM

## 2023-11-07 DIAGNOSIS — D68.9 COAGULATION DISORDER (MULTI): ICD-10-CM

## 2023-11-07 DIAGNOSIS — R73.9 HYPERGLYCEMIA: ICD-10-CM

## 2023-11-07 DIAGNOSIS — E55.9 VITAMIN D DEFICIENCY: ICD-10-CM

## 2023-11-07 LAB
APTT PPP: 26.6 SECONDS (ref 22–32.5)
EST. AVERAGE GLUCOSE BLD GHB EST-MCNC: 111 MG/DL
FERRITIN SERPL-MCNC: 41 NG/ML (ref 13–150)
FOLATE SERPL-MCNC: 7.2 NG/ML (ref 4.2–19.9)
HBA1C MFR BLD: 5.5 %
INR PPP: 1 (ref 0.9–1.2)
IRON SATN MFR SERPL: 9 % (ref 12–50)
IRON SERPL-MCNC: 34 UG/DL (ref 30–160)
PROTHROMBIN TIME: 10.9 SECONDS (ref 9.3–12.7)
TIBC SERPL-MCNC: 359 UG/DL (ref 228–428)
UIBC SERPL-MCNC: 325 UG/DL (ref 110–370)
VIT B12 SERPL-MCNC: 929 PG/ML (ref 211–946)

## 2023-11-07 PROCEDURE — 84630 ASSAY OF ZINC: CPT

## 2023-11-07 PROCEDURE — 82746 ASSAY OF FOLIC ACID SERUM: CPT

## 2023-11-07 PROCEDURE — 82525 ASSAY OF COPPER: CPT

## 2023-11-07 PROCEDURE — 87449 NOS EACH ORGANISM AG IA: CPT

## 2023-11-07 PROCEDURE — 83550 IRON BINDING TEST: CPT

## 2023-11-07 PROCEDURE — 83540 ASSAY OF IRON: CPT

## 2023-11-07 PROCEDURE — 83970 ASSAY OF PARATHORMONE: CPT

## 2023-11-07 PROCEDURE — 82728 ASSAY OF FERRITIN: CPT

## 2023-11-07 PROCEDURE — 36415 COLL VENOUS BLD VENIPUNCTURE: CPT

## 2023-11-07 PROCEDURE — 82607 VITAMIN B-12: CPT

## 2023-11-07 PROCEDURE — 85610 PROTHROMBIN TIME: CPT

## 2023-11-07 PROCEDURE — 83498 ASY HYDROXYPROGESTERONE 17-D: CPT

## 2023-11-07 PROCEDURE — 83036 HEMOGLOBIN GLYCOSYLATED A1C: CPT

## 2023-11-07 PROCEDURE — 84425 ASSAY OF VITAMIN B-1: CPT

## 2023-11-07 PROCEDURE — 85730 THROMBOPLASTIN TIME PARTIAL: CPT

## 2023-11-07 PROCEDURE — 84590 ASSAY OF VITAMIN A: CPT

## 2023-11-07 PROCEDURE — 84446 ASSAY OF VITAMIN E: CPT

## 2023-11-08 LAB — PTH-INTACT SERPL-MCNC: 36.2 PG/ML (ref 18.5–88)

## 2023-11-08 RX ORDER — FERROUS SULFATE 325(65) MG
325 TABLET, DELAYED RELEASE (ENTERIC COATED) ORAL
Qty: 90 TABLET | Refills: 1 | Status: SHIPPED | OUTPATIENT
Start: 2023-11-08 | End: 2024-11-07

## 2023-11-08 NOTE — RESULT ENCOUNTER NOTE
Labs ok except iron low normal, I recommend iron supplement every other day, I sent prescription to her pharmacy

## 2023-11-09 LAB
COPPER SERPL-MCNC: 144.1 UG/DL (ref 80–155)
ZINC SERPL-MCNC: 74.7 UG/DL (ref 60–120)

## 2023-11-10 LAB
A-TOCOPHEROL VIT E SERPL-MCNC: 6.9 MG/L (ref 5.5–18)
ANNOTATION COMMENT IMP: NORMAL
BETA+GAMMA TOCOPHEROL SERPL-MCNC: 2.5 MG/L (ref 0–6)
H PYLORI AG STL QL IA: NEGATIVE
RETINYL PALMITATE SERPL-MCNC: <0.02 MG/L (ref 0–0.1)
VIT A SERPL-MCNC: 0.52 MG/L (ref 0.3–1.2)

## 2023-11-11 LAB — 17OHP SERPL-MCNC: 19.93 NG/DL

## 2023-11-12 LAB — VIT B1 PYROPHOSHATE BLD-SCNC: 87 NMOL/L (ref 70–180)

## 2023-11-24 ENCOUNTER — OFFICE VISIT (OUTPATIENT)
Dept: PRIMARY CARE | Facility: CLINIC | Age: 23
End: 2023-11-24
Payer: COMMERCIAL

## 2023-11-24 VITALS
WEIGHT: 293 LBS | TEMPERATURE: 97.6 F | DIASTOLIC BLOOD PRESSURE: 84 MMHG | OXYGEN SATURATION: 98 % | HEART RATE: 82 BPM | SYSTOLIC BLOOD PRESSURE: 136 MMHG | BODY MASS INDEX: 56.21 KG/M2

## 2023-11-24 DIAGNOSIS — E78.5 DYSLIPIDEMIA: ICD-10-CM

## 2023-11-24 DIAGNOSIS — I10 ESSENTIAL HYPERTENSION: Primary | ICD-10-CM

## 2023-11-24 DIAGNOSIS — E66.01 MORBID OBESITY WITH BMI OF 50.0-59.9, ADULT (MULTI): ICD-10-CM

## 2023-11-24 PROCEDURE — 3079F DIAST BP 80-89 MM HG: CPT | Performed by: INTERNAL MEDICINE

## 2023-11-24 PROCEDURE — 1036F TOBACCO NON-USER: CPT | Performed by: INTERNAL MEDICINE

## 2023-11-24 PROCEDURE — 3008F BODY MASS INDEX DOCD: CPT | Performed by: INTERNAL MEDICINE

## 2023-11-24 PROCEDURE — 99213 OFFICE O/P EST LOW 20 MIN: CPT | Performed by: INTERNAL MEDICINE

## 2023-11-24 PROCEDURE — 3075F SYST BP GE 130 - 139MM HG: CPT | Performed by: INTERNAL MEDICINE

## 2023-11-24 ASSESSMENT — LIFESTYLE VARIABLES
HOW OFTEN DO YOU HAVE A DRINK CONTAINING ALCOHOL: 2-4 TIMES A MONTH
HOW MANY STANDARD DRINKS CONTAINING ALCOHOL DO YOU HAVE ON A TYPICAL DAY: 1 OR 2

## 2023-11-24 ASSESSMENT — PAIN SCALES - GENERAL: PAINLEVEL: 0-NO PAIN

## 2023-11-24 ASSESSMENT — ENCOUNTER SYMPTOMS
LOSS OF SENSATION IN FEET: 0
DEPRESSION: 0
OCCASIONAL FEELINGS OF UNSTEADINESS: 0

## 2023-11-24 ASSESSMENT — PATIENT HEALTH QUESTIONNAIRE - PHQ9
2. FEELING DOWN, DEPRESSED OR HOPELESS: NOT AT ALL
1. LITTLE INTEREST OR PLEASURE IN DOING THINGS: NOT AT ALL
SUM OF ALL RESPONSES TO PHQ9 QUESTIONS 1 AND 2: 0

## 2023-11-24 NOTE — PROGRESS NOTES
Subjective   Patient ID: Shravan Astudillo is a 23 y.o. female who presents for Follow-up.    HPI       Has history of hypertension, morbid obesity, anxiety with depression.         H/O- hypertension. Denied any medication side effects Denied any headache, blurry vision, chest pain.          C/O anxiety and depression.  Able to manage without medications at this time.    She is considering bariatric surgery, following with Vicki Walters and [place].  She was advised to lose 20 pounds before surgery.  Started working out twice a week. Doing treadmill    Review of Systems   Constitutional:  Negative for chills, fatigue and unexpected weight change.   HENT:  Negative for postnasal drip, sinus pressure and trouble swallowing.    Respiratory:  Negative for cough, shortness of breath and wheezing.    Cardiovascular:  Negative for chest pain, palpitations and leg swelling.   Gastrointestinal:  Negative for abdominal pain, blood in stool, nausea and vomiting.   Endocrine: Negative for polydipsia, polyphagia and polyuria.   Genitourinary:  Negative for dysuria and frequency.   Musculoskeletal:  Negative for back pain and myalgias.   Skin:  Negative for rash.   Neurological:  Negative for tremors, seizures and numbness.       Objective   /84 (BP Location: Left arm, Patient Position: Sitting, BP Cuff Size: Adult)   Pulse 82   Temp 36.4 °C (97.6 °F) (Temporal)   Wt (!) 166 kg (367 lb)   SpO2 98%   BMI 56.21 kg/m²     Physical Exam  Constitutional:       General: She is not in acute distress.  HENT:      Head: Normocephalic and atraumatic.   Eyes:      Extraocular Movements: Extraocular movements intact.      Conjunctiva/sclera: Conjunctivae normal.   Cardiovascular:      Rate and Rhythm: Normal rate and regular rhythm.      Pulses: Normal pulses.      Heart sounds: No murmur heard.  Pulmonary:      Effort: Pulmonary effort is normal.      Breath sounds: Normal breath sounds. No wheezing or rales.   Abdominal:       General: Bowel sounds are normal.      Palpations: Abdomen is soft. There is no mass.      Tenderness: There is no abdominal tenderness. There is no guarding.   Musculoskeletal:         General: Normal range of motion.   Neurological:      General: No focal deficit present.      Mental Status: She is alert and oriented to person, place, and time.      Sensory: No sensory deficit.   Psychiatric:         Mood and Affect: Mood normal.             Assessment/Plan       Shravan was seen today for follow-up.  Diagnoses and all orders for this visit:  Essential hypertension (Primary)  Dyslipidemia  Morbid obesity with BMI of 50.0-59.9, adult (CMS/HCC)     Advised to limit daily calories to less than 2000 mary beth a day.  Moderate intensity exercise at least 30 minutes/day and increase as tolerated.

## 2023-11-28 ENCOUNTER — NUTRITION (OUTPATIENT)
Dept: SURGERY | Facility: CLINIC | Age: 23
End: 2023-11-28
Payer: COMMERCIAL

## 2023-11-28 ASSESSMENT — ENCOUNTER SYMPTOMS
SHORTNESS OF BREATH: 0
CHILLS: 0
COUGH: 0
DYSURIA: 0
ABDOMINAL PAIN: 0
NUMBNESS: 0
BLOOD IN STOOL: 0
VOMITING: 0
PALPITATIONS: 0
NAUSEA: 0
TROUBLE SWALLOWING: 0
POLYPHAGIA: 0
MYALGIAS: 0
BACK PAIN: 0
SEIZURES: 0
UNEXPECTED WEIGHT CHANGE: 0
POLYDIPSIA: 0
WHEEZING: 0
TREMORS: 0
SINUS PRESSURE: 0
FREQUENCY: 0
FATIGUE: 0

## 2023-11-28 NOTE — PROGRESS NOTES
Medical Weight Loss Appointment 2    Starting Weight: 366 lbs   Current Weight: 367 lbs / 3 lb weight gain from last appointment   Current BMI:  56.21    Daily Intake:   Breakfast- [employer] oatmeal bites (3 pieces = 170 calories and 13-14 grams of sugar) and eggs. Or sometimes a Greek yogurt that provides 20 grams of protein   Lunch- Ham and cheese (provolone or muster) roll ups sometimes with egg life wrap  Dinner- The past 2 nights Shravan has had soup. She typically makes her soup which consists of Cabbage, chicken and gnocchi or chili.   Snacks: minimal. Shravan reports if she does it is usually a Greek yogurt   Beverages: Mostly hint water, On average 1-2 cups of tea with sugar daily - she adds ~4 packets of sugar in each cup. Shravan has cut out pop  Exercise: Going to the gym for an hour 2 days per week - going to [employer] with her friend   Behavior/Diet Changes: Shravan reports that she has been being more mindful of her food choices. She is looking at nutrition labels and has practiced slowing down when eating. She has used [employer] sy[employer]vester in the past but has not used consistently this month. She started to exercise with her friend.     Today's Lesson: Reviewed patient's dietary changes and food recall  I encouraged Shravan to track her food intake in [employer] or keep count of calories in a food log. Informed Shravan that we can review food logs at next appointment.   Suggested trying stevia/monk fruit sweetener in place of sugar and honey.   Recommended measuring out soup to 1 cup   Suggested adding in a workout day at home - walking, workout video     Diet Goals: Practice the lifelong rules  Exercise Recommendations: Gradually work up to 150 minutes of moderate intensity exercise per week.  Goals for the month:   - Track calories and recommended <2000 calories per day   - Reduce sugar intake in tea     Plan: Will follow up next month. Will continue to monitor pt's weight, dietary & behavior changes.       Tiffanie Moeller, MS, RD, LD

## 2023-12-13 ENCOUNTER — TELEPHONE (OUTPATIENT)
Dept: PRIMARY CARE | Facility: CLINIC | Age: 23
End: 2023-12-13
Payer: COMMERCIAL

## 2023-12-13 DIAGNOSIS — I10 ESSENTIAL HYPERTENSION: ICD-10-CM

## 2023-12-13 RX ORDER — HYDROCHLOROTHIAZIDE 25 MG/1
25 TABLET ORAL DAILY
Qty: 90 TABLET | Refills: 1 | Status: SHIPPED | OUTPATIENT
Start: 2023-12-13 | End: 2024-02-23 | Stop reason: ALTCHOICE

## 2023-12-13 RX ORDER — ENALAPRIL MALEATE 20 MG/1
20 TABLET ORAL DAILY
Qty: 90 TABLET | Refills: 1 | Status: SHIPPED | OUTPATIENT
Start: 2023-12-13 | End: 2024-02-23 | Stop reason: ALTCHOICE

## 2023-12-13 RX ORDER — ATENOLOL 50 MG/1
50 TABLET ORAL DAILY
Qty: 30 TABLET | Refills: 1 | Status: SHIPPED | OUTPATIENT
Start: 2023-12-13 | End: 2024-01-22

## 2023-12-13 NOTE — TELEPHONE ENCOUNTER
From ans machine 112/13/23 @7:49am:  Pt asking for refill for hydrochlorothiazide, Enalapril and Atenolol to [facility]

## 2023-12-26 ENCOUNTER — APPOINTMENT (OUTPATIENT)
Dept: SURGERY | Facility: CLINIC | Age: 23
End: 2023-12-26
Payer: COMMERCIAL

## 2024-01-21 DIAGNOSIS — I10 ESSENTIAL HYPERTENSION: ICD-10-CM

## 2024-01-22 RX ORDER — ATENOLOL 50 MG/1
50 TABLET ORAL DAILY
Qty: 90 TABLET | Refills: 1 | Status: SHIPPED | OUTPATIENT
Start: 2024-01-22 | End: 2024-02-23 | Stop reason: ALTCHOICE

## 2024-01-24 ENCOUNTER — TELEPHONE (OUTPATIENT)
Dept: SURGERY | Facility: CLINIC | Age: 24
End: 2024-01-24

## 2024-02-20 ENCOUNTER — TELEPHONE (OUTPATIENT)
Dept: OBSTETRICS AND GYNECOLOGY | Facility: CLINIC | Age: 24
End: 2024-02-20

## 2024-02-20 ENCOUNTER — TELEPHONE (OUTPATIENT)
Dept: PRIMARY CARE | Facility: CLINIC | Age: 24
End: 2024-02-20

## 2024-02-20 NOTE — TELEPHONE ENCOUNTER
Est pt currently pregnant (ameno scheduled for 03/20) calling stating she currently stopped BP meds due to pos UPT. Pt was taking Enelapril 20mg, Atenolol 50mg and hydrochlorothiazide 25mg. Pt asking what to do for BP now pregnant. Pt called PCP office but has not heard back yet. Please advise.

## 2024-02-20 NOTE — TELEPHONE ENCOUNTER
Let pt know, labetalol, procardia, aldomet are compatible with pregnancy can see if PCP can start one of those meds, can continue hydrochlorothiazide if she is doing well on that one; would recommend pt call high risk clinic at  bc she will not be able to deliver at our hospital due to pre-existing conditions (BMI, BP on multiple meds), so should just get established with them.

## 2024-02-23 ENCOUNTER — OFFICE VISIT (OUTPATIENT)
Dept: PRIMARY CARE | Facility: CLINIC | Age: 24
End: 2024-02-23
Payer: COMMERCIAL

## 2024-02-23 VITALS
OXYGEN SATURATION: 96 % | HEIGHT: 67 IN | WEIGHT: 293 LBS | DIASTOLIC BLOOD PRESSURE: 98 MMHG | SYSTOLIC BLOOD PRESSURE: 144 MMHG | BODY MASS INDEX: 45.99 KG/M2 | HEART RATE: 75 BPM | TEMPERATURE: 98.6 F

## 2024-02-23 DIAGNOSIS — I10 ESSENTIAL HYPERTENSION: Primary | ICD-10-CM

## 2024-02-23 DIAGNOSIS — E61.1 IRON DEFICIENCY: ICD-10-CM

## 2024-02-23 DIAGNOSIS — Z3A.01 LESS THAN 8 WEEKS GESTATION OF PREGNANCY (HHS-HCC): ICD-10-CM

## 2024-02-23 PROCEDURE — 99214 OFFICE O/P EST MOD 30 MIN: CPT | Performed by: INTERNAL MEDICINE

## 2024-02-23 PROCEDURE — 3008F BODY MASS INDEX DOCD: CPT | Performed by: INTERNAL MEDICINE

## 2024-02-23 PROCEDURE — 1036F TOBACCO NON-USER: CPT | Performed by: INTERNAL MEDICINE

## 2024-02-23 PROCEDURE — 3077F SYST BP >= 140 MM HG: CPT | Performed by: INTERNAL MEDICINE

## 2024-02-23 PROCEDURE — 3080F DIAST BP >= 90 MM HG: CPT | Performed by: INTERNAL MEDICINE

## 2024-02-23 RX ORDER — LABETALOL 200 MG/1
100 TABLET, FILM COATED ORAL 2 TIMES DAILY
Qty: 30 TABLET | Refills: 0 | Status: SHIPPED | OUTPATIENT
Start: 2024-02-23 | End: 2024-02-29 | Stop reason: DRUGHIGH

## 2024-02-23 ASSESSMENT — ENCOUNTER SYMPTOMS
VOMITING: 0
BLOOD IN STOOL: 0
POLYDIPSIA: 0
TREMORS: 0
COUGH: 0
PALPITATIONS: 0
CHILLS: 0
TROUBLE SWALLOWING: 0
DEPRESSION: 0
LOSS OF SENSATION IN FEET: 0
WHEEZING: 0
UNEXPECTED WEIGHT CHANGE: 0
NUMBNESS: 0
SHORTNESS OF BREATH: 0
NAUSEA: 1
SINUS PRESSURE: 0
SEIZURES: 0
OCCASIONAL FEELINGS OF UNSTEADINESS: 0
FATIGUE: 0
DYSURIA: 0
FREQUENCY: 0
POLYPHAGIA: 0
ABDOMINAL PAIN: 0
BACK PAIN: 0
MYALGIAS: 0

## 2024-02-23 ASSESSMENT — PAIN SCALES - GENERAL: PAINLEVEL: 0-NO PAIN

## 2024-02-23 NOTE — PROGRESS NOTES
"Subjective   Patient ID: Shravan Astudillo is a 23 y.o. female who presents for discuss medication.    HPI     Patient is here for follow-up.  Stated she found out she was pregnant earlier this week on Monday.  This is an unplanned pregnancy  She was aware that she is not supposed to be taking her blood pressure medications during pregnancy, she stopped as soon as she found out  Started prenatal multivitamins this week  She has been checking blood pressure at home systolic blood pressure ranging from 145-170  Denied any headaches, chest pain, palpitations  Feeling nauseous occasionally since last 1 week  Has appointment with gynecologist next week      Review of Systems   Constitutional:  Negative for chills, fatigue and unexpected weight change.   HENT:  Negative for postnasal drip, sinus pressure and trouble swallowing.    Respiratory:  Negative for cough, shortness of breath and wheezing.    Cardiovascular:  Negative for chest pain, palpitations and leg swelling.   Gastrointestinal:  Positive for nausea. Negative for abdominal pain, blood in stool and vomiting.   Endocrine: Negative for polydipsia, polyphagia and polyuria.   Genitourinary:  Negative for dysuria and frequency.   Musculoskeletal:  Negative for back pain and myalgias.   Skin:  Negative for rash.   Neurological:  Negative for tremors, seizures and numbness.   Psychiatric/Behavioral:  Negative for behavioral problems.        Objective   BP (!) 144/98 (BP Location: Right arm, Patient Position: Sitting, BP Cuff Size: Large adult)   Pulse 75   Temp 37 °C (98.6 °F) (Temporal)   Ht 1.702 m (5' 7\")   Wt (!) 172 kg (379 lb)   LMP  (LMP Unknown)   SpO2 96%   BMI 59.36 kg/m²     Physical Exam  Constitutional:       General: She is not in acute distress.     Appearance: Normal appearance.   HENT:      Head: Normocephalic and atraumatic.   Eyes:      Extraocular Movements: Extraocular movements intact.      Pupils: Pupils are equal, round, and reactive " to light.   Cardiovascular:      Rate and Rhythm: Normal rate and regular rhythm.      Heart sounds: Normal heart sounds. No murmur heard.  Pulmonary:      Effort: Pulmonary effort is normal. No respiratory distress.      Breath sounds: Normal breath sounds. No wheezing or rales.   Abdominal:      General: Bowel sounds are normal.      Palpations: Abdomen is soft.      Tenderness: There is no abdominal tenderness. There is no guarding.   Musculoskeletal:      Right lower leg: No edema.      Left lower leg: No edema.   Neurological:      General: No focal deficit present.      Mental Status: She is alert and oriented to person, place, and time.      Cranial Nerves: No cranial nerve deficit.   Psychiatric:         Mood and Affect: Mood normal.         Assessment/Plan        Shravan was seen today for discuss medication.  Diagnoses and all orders for this visit:  Essential hypertension (Primary)  -     labetalol (Normodyne) 200 mg tablet; Take 0.5 tablets (100 mg) by mouth 2 times a day.  Iron deficiency     Continue with prenatal vitamins  Take iron tablets as tolerated  Avoid taking NSAIDs, take Tylenol if needed for pain

## 2024-02-28 NOTE — ASSESSMENT & PLAN NOTE
Shravan Astudillo' pre-pregnancy BMI is 59.  We discussed the pregnancy implications including increased risk of pre-eclampsia, gestational diabetes,  delivery, LGA/macrosomia, growth restriction, stillborn, shoulder dystocia, venous thromboembolic disease, congenital anomalies.    We also discussed the [place] of Medicine recommendations for gestational weight gain obese women and that we recommend that she limit her weight gain to 10-15 lbs during the pregnancy.    To address some of these concerns we discussed increased pregnancy surveillance to include an early screen for diabetes, detailed anatomic evaluation of the fetus and serial growth ultrasound to assess fetal weight.  We discussed the role of a healthy diet with consideration for referral to a nutritionist as well as exercise.  We also briefly discussed increased risk of complications with anesthesia and recommendation for consultation with anesthesia in the 3rd trimester.

## 2024-02-28 NOTE — ASSESSMENT & PLAN NOTE
We discussed risks associated with cHTN in pregnancy including pre-eclampsia, fetal growth restriction, IUFD, oligohydramnios, placental abruption.   Discussed blood pressure targets are lower in pregnancy than outside of pregnancy and we would recommend starting oral antihypertensive therapy for blood pressures over 140 systolic or 90 diastolic with a target blood pressure in 140/90s, per CHAP trial  We discussed that due to the physiologic decrease in systemic vascular resistance during pregnancy, blood pressure often is lower in the first and second trimesters before returning to their baseline values in the third trimester and that blood pressure medications often need to be titrated as gestation advanced.  She is currently on labetalol 100mg BID. We reviewed the safety profile of antihypertensives in pregnancy with labetalol, nifedipine, and hydralazine. Given her pre-pregnancy antihypertensive regimen, her dose was increased today.  Baseline HELLP wnl and PCR obtained today  Discussed Initiation of ASA 81 mg starting at 12 weeks gestation for pre-eclampsia prophylaxis  Recommend growth US at 28/32/36 weeks   testing is dependent on the patient’s BP control. If oral antihypertensive is required we recommend  testing with twice weekly NSTs to start at 32 weeks. If poorly controlled would recommend once weekly testing with BPP starting at 28 wks, twice weekly testing from 32 wks to delivery; if well controlled weekly NST from 32 wks to delivery  Recommend delivery from 37 to 39 weeks depending on blood pressure control

## 2024-02-28 NOTE — ASSESSMENT & PLAN NOTE
Shravan has a history of anxiety and depression.   She reports having been treated previously with Prozac but is not currently on medication.   We reviewed the implications of treatment for depression in pregnancy and that generally the benefits of treated depression outweigh potential fetal risks associated with medication use. We did review that antidepressant medication is considered safe in pregnancy with the majority of data pertaining to SSRIs. While there is some potential for  withdrawal this is generally mild.    Given its safety profile, Zoloft is often considered as a first line medication though SSRIs may be used (paroxetine should be avoided due to its association with cardiac defects).  Patient currently declines medication or counseling, but is open to counseling this pregnancy.

## 2024-02-29 ENCOUNTER — INITIAL PRENATAL (OUTPATIENT)
Dept: MATERNAL FETAL MEDICINE | Facility: CLINIC | Age: 24
End: 2024-02-29
Payer: COMMERCIAL

## 2024-02-29 ENCOUNTER — HOSPITAL ENCOUNTER (OUTPATIENT)
Dept: RADIOLOGY | Facility: CLINIC | Age: 24
Discharge: HOME | End: 2024-02-29
Payer: COMMERCIAL

## 2024-02-29 VITALS — BODY MASS INDEX: 59.41 KG/M2 | DIASTOLIC BLOOD PRESSURE: 83 MMHG | WEIGHT: 293 LBS | SYSTOLIC BLOOD PRESSURE: 148 MMHG

## 2024-02-29 DIAGNOSIS — O99.341 DEPRESSION AFFECTING PREGNANCY IN FIRST TRIMESTER, ANTEPARTUM (HHS-HCC): ICD-10-CM

## 2024-02-29 DIAGNOSIS — O10.011 PRE-EXISTING ESSENTIAL HYPERTENSION DURING PREGNANCY IN FIRST TRIMESTER (HHS-HCC): ICD-10-CM

## 2024-02-29 DIAGNOSIS — Z3A.01 LESS THAN 8 WEEKS GESTATION OF PREGNANCY (HHS-HCC): ICD-10-CM

## 2024-02-29 DIAGNOSIS — I10 ESSENTIAL HYPERTENSION: Primary | ICD-10-CM

## 2024-02-29 DIAGNOSIS — Z3A.01 5 WEEKS GESTATION OF PREGNANCY (HHS-HCC): ICD-10-CM

## 2024-02-29 DIAGNOSIS — F32.A DEPRESSION AFFECTING PREGNANCY IN FIRST TRIMESTER, ANTEPARTUM (HHS-HCC): ICD-10-CM

## 2024-02-29 DIAGNOSIS — O99.211 OBESITY AFFECTING PREGNANCY IN FIRST TRIMESTER, UNSPECIFIED OBESITY TYPE (HHS-HCC): ICD-10-CM

## 2024-02-29 PROBLEM — O99.611: Status: ACTIVE | Noted: 2023-10-15

## 2024-02-29 PROCEDURE — 76817 TRANSVAGINAL US OBSTETRIC: CPT | Performed by: MEDICAL GENETICS

## 2024-02-29 PROCEDURE — 76801 OB US < 14 WKS SINGLE FETUS: CPT | Performed by: MEDICAL GENETICS

## 2024-02-29 PROCEDURE — 99204 OFFICE O/P NEW MOD 45 MIN: CPT | Performed by: OBSTETRICS & GYNECOLOGY

## 2024-02-29 PROCEDURE — 99214 OFFICE O/P EST MOD 30 MIN: CPT | Mod: GC | Performed by: OBSTETRICS & GYNECOLOGY

## 2024-02-29 PROCEDURE — 76801 OB US < 14 WKS SINGLE FETUS: CPT

## 2024-02-29 RX ORDER — LABETALOL 200 MG/1
400 TABLET, FILM COATED ORAL 2 TIMES DAILY
Qty: 120 TABLET | Refills: 11 | Status: SHIPPED | OUTPATIENT
Start: 2024-02-29 | End: 2024-03-14 | Stop reason: ALTCHOICE

## 2024-02-29 ASSESSMENT — EDINBURGH POSTNATAL DEPRESSION SCALE (EPDS)
I HAVE BEEN SO UNHAPPY THAT I HAVE HAD DIFFICULTY SLEEPING: NOT AT ALL
I HAVE BEEN ANXIOUS OR WORRIED FOR NO GOOD REASON: YES, SOMETIMES
I HAVE BLAMED MYSELF UNNECESSARILY WHEN THINGS WENT WRONG: YES, SOME OF THE TIME
I HAVE BEEN SO UNHAPPY THAT I HAVE BEEN CRYING: NO, NEVER
I HAVE FELT SAD OR MISERABLE: NOT VERY OFTEN
I HAVE LOOKED FORWARD WITH ENJOYMENT TO THINGS: AS MUCH AS I EVER DID
TOTAL SCORE: 7
THE THOUGHT OF HARMING MYSELF HAS OCCURRED TO ME: NEVER
I HAVE BEEN ABLE TO LAUGH AND SEE THE FUNNY SIDE OF THINGS: AS MUCH AS I ALWAYS COULD
THINGS HAVE BEEN GETTING ON TOP OF ME: NO, I HAVE BEEN COPING AS WELL AS EVER
I HAVE FELT SCARED OR PANICKY FOR NO GOOD REASON: YES, SOMETIMES

## 2024-02-29 ASSESSMENT — ENCOUNTER SYMPTOMS
EYES NEGATIVE: 0
CONSTITUTIONAL NEGATIVE: 0
PSYCHIATRIC NEGATIVE: 0
NEUROLOGICAL NEGATIVE: 0
ENDOCRINE NEGATIVE: 0
GASTROINTESTINAL NEGATIVE: 0
ALLERGIC/IMMUNOLOGIC NEGATIVE: 0
RESPIRATORY NEGATIVE: 0
CARDIOVASCULAR NEGATIVE: 0
MUSCULOSKELETAL NEGATIVE: 0
HEMATOLOGIC/LYMPHATIC NEGATIVE: 0

## 2024-02-29 NOTE — ASSESSMENT & PLAN NOTE
US today with GS no YS or fetal pole - could be early pregnancy vs anembryonic pregnancy. Repeat in 2 weeks   Continue prenatal vitamins

## 2024-02-29 NOTE — LETTER
February 29, 2024     Patient: Shravan Astudillo   YOB: 2000   Date of Visit: 2/29/2024       To Whom It May Concern:    Shravan Astudillo was seen in my clinic on 2/29/2024  Please excuse Shravan for her absence from work on this day to make the appointment.    If you have any questions or concerns, please don't hesitate to call.         Sincerely,         Chandler Delatorre MD        CC: No Recipients

## 2024-02-29 NOTE — LETTER
2/29/2024    To Whom It May Concern:    Shravan Astudillo is being followed for her pregnancy at [facility].  Estimated Date of Delivery: None noted.    Sincerely,        Chandler Delatorre MD  [facility]

## 2024-02-29 NOTE — ASSESSMENT & PLAN NOTE
Shravan Astudillo' pre-pregnancy BMI is 59.  We discussed the pregnancy implications including increased risk of pre-eclampsia, gestational diabetes,  delivery, LGA/macrosomia, growth restriction, stillborn, shoulder dystocia, venous thromboembolic disease, congenital anomalies.    We also discussed the [place] of Medicine recommendations for gestational weight gain obese women and that we recommend that she limit her weight gain to 10-15 lbs during the pregnancy.    To address some of these concerns we discussed increased pregnancy surveillance to include an early screen for diabetes, detailed anatomic evaluation of the fetus and serial growth ultrasound to assess fetal weight.  We discussed the role of a healthy diet with consideration for referral to a nutritionist as well as exercise.  Referral to nutrition placed today  Will order early GDM screening with initial prenatal labs at next visit

## 2024-02-29 NOTE — PROGRESS NOTES
2024   Shravan Astudillo     [facility] CONSULT NOTE  Referring Clinician: Bright Waldrop  Reason for consult: hypertension, high BMI    HPI: Shravan Astudillo is a 23 y.o.  at Unknown gestational age here for consult for elevated blood pressure, high BMI.    Patient reports that this was an unplanned pregnancy but she is accepting and happy about it. Previously had a history of chronic HTN managed on Enelapril 20mg, Atenolol 50mg and hydrochlorothiazide 25mg. She self discontinued these upon finding out she was pregnant and her PCP put her on labetalol 100mg BID.     Also reports high BMI, pre-pregnancy BMI. Was previously considering gastric sleeve surgery and met with weight loss surgery, but was unsure at that time and would now like to continue the pregnancy. Reports history of anxiety and depression, on prozac previously but no longer on medications.  Not currently interested in medication or therapy but may be in the near future.    Reports three days of nausea and vomiting which have since resolved. Denies other OB symptoms.    10 point review of system is negative except as above    OB History          1    Para        Term                AB        Living             SAB        IAB        Ectopic        Multiple        Live Births                       Past medical history: Denies DM, asthma, depression, or thyroid issues    No past surgical history on file.    Medications: labetalol 100mg BID, prenatal vitamin, probiotic    No Known Allergies    Social History     Tobacco Use    Smoking status: Never    Smokeless tobacco: Never   Vaping Use    Vaping Use: Never used   Substance Use Topics    Alcohol use: Yes    Drug use: Never       family history includes Diabetes in her sister; Drug abuse in her mother; Endometriosis in her maternal grandmother and mother; Polycystic ovary syndrome in her sister; htn in her father; morbid obesity in her father.      OBJECTIVE  Visit Vitals  /83    Wt (!) 172 kg (379 lb 4.8 oz)   LMP  (LMP Unknown)   BMI 59.41 kg/m²   OB Status Pregnant   Smoking Status Never   BSA 2.85 m²       Physical exam  General: no acute distress  HEENT: normocephalic, atraumatic  Heart: warm and well perfused  Lungs: breathing comfortably on room air  Abdomen: obese  Extremities: moving all extremities  Neuro: awake and conversant  Psych: appropriate mood and affect    FHT: too early to visualize    ASSESSMENT & PLAN    Shravan Astudillo is a 23 y.o.  (too early for pregnancy dating) here for the following:    Depression affecting pregnancy in first trimester, antepartum  Shravan has a history of anxiety and depression.   She reports having been treated previously with Prozac but is not currently on medication.   We reviewed the implications of treatment for depression in pregnancy and that generally the benefits of treated depression outweigh potential fetal risks associated with medication use. We did review that antidepressant medication is considered safe in pregnancy with the majority of data pertaining to SSRIs. While there is some potential for  withdrawal this is generally mild.    Given its safety profile, Zoloft is often considered as a first line medication though SSRIs may be used (paroxetine should be avoided due to its association with cardiac defects).  Patient currently declines medication or counseling, but is open to counseling this pregnancy.     Obesity affecting pregnancy in first trimester  Shravan Astudillo' pre-pregnancy BMI is 59.  We discussed the pregnancy implications including increased risk of pre-eclampsia, gestational diabetes,  delivery, LGA/macrosomia, growth restriction, stillborn, shoulder dystocia, venous thromboembolic disease, congenital anomalies.    We also discussed the [place] of Medicine recommendations for gestational weight gain obese women and that we recommend that she limit her weight gain to 10-15 lbs during the  pregnancy.    To address some of these concerns we discussed increased pregnancy surveillance to include an early screen for diabetes, detailed anatomic evaluation of the fetus and serial growth ultrasound to assess fetal weight.  We discussed the role of a healthy diet with consideration for referral to a nutritionist as well as exercise.  Referral to nutrition placed today  Will order early GDM screening with initial prenatal labs at next visit    Pre-existing essential hypertension during pregnancy in first trimester  We discussed risks associated with cHTN in pregnancy including pre-eclampsia, fetal growth restriction, IUFD, oligohydramnios, placental abruption.   Discussed blood pressure targets are lower in pregnancy than outside of pregnancy and we would recommend starting oral antihypertensive therapy for blood pressures over 140 systolic or 90 diastolic with a target blood pressure in 140/90s, per CHAP trial  We discussed that due to the physiologic decrease in systemic vascular resistance during pregnancy, blood pressure often is lower in the first and second trimesters before returning to their baseline values in the third trimester and that blood pressure medications often need to be titrated as gestation advanced.  She is currently on labetalol 100mg BID. We reviewed the safety profile of antihypertensives in pregnancy with labetalol, nifedipine, and hydralazine. Given her pre-pregnancy antihypertensive regimen, her dose was increased today to labetalol 400mg BID.  Baseline HELLP wnl and PCR to be ordered at next visit  EKG to be ordered at next visit  Discussed Initiation of  mg starting at 12 weeks gestation for pre-eclampsia prophylaxis  Recommend growth US at 28/32/36 weeks   testing is dependent on the patient’s BP control. If oral antihypertensive is required we recommend  testing with twice weekly NSTs to start at 32 weeks. If poorly controlled would recommend once weekly  testing with BPP starting at 28 wks, twice weekly testing from 32 wks to delivery; if well controlled weekly NST from 32 wks to delivery  Recommend delivery from 37 to 39 weeks depending on blood pressure control    5 weeks gestation of pregnancy  Too early for dating on today's ultrasound, no CRL measured  For repeat ultrasound in 10 days  Continue prenatal vitamins      Britt Aguilar MD   PGY-3, OBGYN    Maternal Fetal Medicine

## 2024-02-29 NOTE — ASSESSMENT & PLAN NOTE
We discussed risks associated with cHTN in pregnancy including pre-eclampsia, fetal growth restriction, IUFD, oligohydramnios, placental abruption.   Discussed blood pressure targets are lower in pregnancy than outside of pregnancy and we would recommend starting oral antihypertensive therapy for blood pressures over 140 systolic or 90 diastolic with a target blood pressure in 140/90s, per CHAP trial  We discussed that due to the physiologic decrease in systemic vascular resistance during pregnancy, blood pressure often is lower in the first and second trimesters before returning to their baseline values in the third trimester and that blood pressure medications often need to be titrated as gestation advanced.  She is currently on labetalol 100mg BID. We reviewed the safety profile of antihypertensives in pregnancy with labetalol, nifedipine, and hydralazine. Given her pre-pregnancy antihypertensive regimen, her dose was increased today to labetalol 400mg BID.  Baseline HELLP wnl and PCR to be ordered at next visit  EKG to be ordered at next visit  Discussed Initiation of  mg starting at 12 weeks gestation for pre-eclampsia prophylaxis  Recommend growth US at 28/32/36 weeks   testing is dependent on the patient’s BP control. If oral antihypertensive is required we recommend  testing with twice weekly NSTs to start at 32 weeks. If poorly controlled would recommend once weekly testing with BPP starting at 28 wks, twice weekly testing from 32 wks to delivery; if well controlled weekly NST from 32 wks to delivery  Recommend delivery from 37 to 39 weeks depending on blood pressure control

## 2024-02-29 NOTE — LETTER
February 29, 2024     Patient: Shravan Astudillo   YOB: 2000   Date of Visit: 2/29/2024       To Whom It May Concern:    Shravan Astudillo was seen in my clinic on 2/29/2024. Please excuse Shravan for her absence from work on this day to make the appointment.    If you have any questions or concerns, please don't hesitate to call.         Sincerely,         Chandler Dealtorre MD        CC: No Recipients

## 2024-03-13 NOTE — ASSESSMENT & PLAN NOTE
- Home BP: reports 130s/80s-90s (90s <1/2 the time)  - Office BP: 130/77  - Continue labetalol 200 BID  - Baseline labs, echo, EKG ordered today  - New cuff sent, to bring to next visit to validate  - Diagnosed at age 13, recalls workup at that time neg. Reports renal artery US last year that was wnl (not seen in records) - will not pursue further workup at this time

## 2024-03-14 ENCOUNTER — APPOINTMENT (OUTPATIENT)
Dept: OBSTETRICS AND GYNECOLOGY | Facility: CLINIC | Age: 24
End: 2024-03-14
Payer: COMMERCIAL

## 2024-03-14 ENCOUNTER — HOSPITAL ENCOUNTER (OUTPATIENT)
Dept: RADIOLOGY | Facility: CLINIC | Age: 24
Discharge: HOME | End: 2024-03-14
Payer: COMMERCIAL

## 2024-03-14 ENCOUNTER — PHARMACY VISIT (OUTPATIENT)
Dept: PHARMACY | Facility: CLINIC | Age: 24
End: 2024-03-14
Payer: COMMERCIAL

## 2024-03-14 ENCOUNTER — ROUTINE PRENATAL (OUTPATIENT)
Dept: MATERNAL FETAL MEDICINE | Facility: CLINIC | Age: 24
End: 2024-03-14
Payer: COMMERCIAL

## 2024-03-14 ENCOUNTER — LAB (OUTPATIENT)
Dept: LAB | Facility: LAB | Age: 24
End: 2024-03-14
Payer: COMMERCIAL

## 2024-03-14 VITALS — WEIGHT: 293 LBS | DIASTOLIC BLOOD PRESSURE: 77 MMHG | SYSTOLIC BLOOD PRESSURE: 130 MMHG | BODY MASS INDEX: 59.89 KG/M2

## 2024-03-14 DIAGNOSIS — Z3A.01 LESS THAN 8 WEEKS GESTATION OF PREGNANCY (HHS-HCC): ICD-10-CM

## 2024-03-14 DIAGNOSIS — O99.611 GASTROESOPHAGEAL REFLUX DURING PREGNANCY IN FIRST TRIMESTER, ANTEPARTUM (HHS-HCC): ICD-10-CM

## 2024-03-14 DIAGNOSIS — O10.011 PRE-EXISTING ESSENTIAL HYPERTENSION DURING PREGNANCY IN FIRST TRIMESTER (HHS-HCC): Primary | ICD-10-CM

## 2024-03-14 DIAGNOSIS — O99.341 DEPRESSION AFFECTING PREGNANCY IN FIRST TRIMESTER, ANTEPARTUM (HHS-HCC): ICD-10-CM

## 2024-03-14 DIAGNOSIS — O21.9 NAUSEA AND VOMITING IN PREGNANCY PRIOR TO 22 WEEKS GESTATION (HHS-HCC): ICD-10-CM

## 2024-03-14 DIAGNOSIS — O10.011 PRE-EXISTING ESSENTIAL HYPERTENSION DURING PREGNANCY IN FIRST TRIMESTER (HHS-HCC): ICD-10-CM

## 2024-03-14 DIAGNOSIS — K21.9 GASTROESOPHAGEAL REFLUX DURING PREGNANCY IN FIRST TRIMESTER, ANTEPARTUM (HHS-HCC): ICD-10-CM

## 2024-03-14 DIAGNOSIS — F32.A DEPRESSION AFFECTING PREGNANCY IN FIRST TRIMESTER, ANTEPARTUM (HHS-HCC): ICD-10-CM

## 2024-03-14 LAB
ABO GROUP (TYPE) IN BLOOD: NORMAL
ALBUMIN SERPL BCP-MCNC: 4.3 G/DL (ref 3.4–5)
ALP SERPL-CCNC: 69 U/L (ref 33–110)
ALT SERPL W P-5'-P-CCNC: 15 U/L (ref 7–45)
ANION GAP SERPL CALC-SCNC: 12 MMOL/L (ref 10–20)
ANTIBODY SCREEN: NORMAL
AST SERPL W P-5'-P-CCNC: 19 U/L (ref 9–39)
BILIRUB SERPL-MCNC: 0.3 MG/DL (ref 0–1.2)
BUN SERPL-MCNC: 13 MG/DL (ref 6–23)
CALCIUM SERPL-MCNC: 9.9 MG/DL (ref 8.6–10.6)
CHLORIDE SERPL-SCNC: 104 MMOL/L (ref 98–107)
CO2 SERPL-SCNC: 25 MMOL/L (ref 21–32)
CREAT SERPL-MCNC: 0.42 MG/DL (ref 0.5–1.05)
CREAT UR-MCNC: 204.1 MG/DL (ref 20–320)
EGFRCR SERPLBLD CKD-EPI 2021: >90 ML/MIN/1.73M*2
ERYTHROCYTE [DISTWIDTH] IN BLOOD BY AUTOMATED COUNT: 15.2 % (ref 11.5–14.5)
GLUCOSE SERPL-MCNC: 92 MG/DL (ref 74–99)
HBV SURFACE AG SERPL QL IA: NONREACTIVE
HCT VFR BLD AUTO: 40.3 % (ref 36–46)
HCV AB SER QL: NONREACTIVE
HGB BLD-MCNC: 12.7 G/DL (ref 12–16)
HIV 1+2 AB+HIV1 P24 AG SERPL QL IA: NONREACTIVE
MCH RBC QN AUTO: 25.7 PG (ref 26–34)
MCHC RBC AUTO-ENTMCNC: 31.5 G/DL (ref 32–36)
MCV RBC AUTO: 81 FL (ref 80–100)
NRBC BLD-RTO: 0 /100 WBCS (ref 0–0)
PLATELET # BLD AUTO: 337 X10*3/UL (ref 150–450)
POTASSIUM SERPL-SCNC: 4 MMOL/L (ref 3.5–5.3)
PROT SERPL-MCNC: 6.9 G/DL (ref 6.4–8.2)
PROT UR-ACNC: 24 MG/DL (ref 5–24)
PROT/CREAT UR: 0.12 MG/MG CREAT (ref 0–0.17)
RBC # BLD AUTO: 4.95 X10*6/UL (ref 4–5.2)
REFLEX ADDED, ANEMIA PANEL: NORMAL
RH FACTOR (ANTIGEN D): NORMAL
RUBV IGG SERPL IA-ACNC: 2.3 IA
RUBV IGG SERPL QL IA: POSITIVE
SODIUM SERPL-SCNC: 137 MMOL/L (ref 136–145)
T4 FREE SERPL-MCNC: 1.05 NG/DL (ref 0.78–1.48)
TREPONEMA PALLIDUM IGG+IGM AB [PRESENCE] IN SERUM OR PLASMA BY IMMUNOASSAY: NONREACTIVE
TSH SERPL-ACNC: 4.28 MIU/L (ref 0.44–3.98)
WBC # BLD AUTO: 9.5 X10*3/UL (ref 4.4–11.3)

## 2024-03-14 PROCEDURE — 80053 COMPREHEN METABOLIC PANEL: CPT

## 2024-03-14 PROCEDURE — 86850 RBC ANTIBODY SCREEN: CPT

## 2024-03-14 PROCEDURE — 87389 HIV-1 AG W/HIV-1&-2 AB AG IA: CPT

## 2024-03-14 PROCEDURE — 99214 OFFICE O/P EST MOD 30 MIN: CPT | Mod: GC | Performed by: OBSTETRICS & GYNECOLOGY

## 2024-03-14 PROCEDURE — 86780 TREPONEMA PALLIDUM: CPT

## 2024-03-14 PROCEDURE — 86901 BLOOD TYPING SEROLOGIC RH(D): CPT

## 2024-03-14 PROCEDURE — 99214 OFFICE O/P EST MOD 30 MIN: CPT | Performed by: OBSTETRICS & GYNECOLOGY

## 2024-03-14 PROCEDURE — 76801 OB US < 14 WKS SINGLE FETUS: CPT

## 2024-03-14 PROCEDURE — 86900 BLOOD TYPING SEROLOGIC ABO: CPT

## 2024-03-14 PROCEDURE — 84439 ASSAY OF FREE THYROXINE: CPT

## 2024-03-14 PROCEDURE — 82570 ASSAY OF URINE CREATININE: CPT | Performed by: OBSTETRICS & GYNECOLOGY

## 2024-03-14 PROCEDURE — 87340 HEPATITIS B SURFACE AG IA: CPT

## 2024-03-14 PROCEDURE — 83020 HEMOGLOBIN ELECTROPHORESIS: CPT | Performed by: OBSTETRICS & GYNECOLOGY

## 2024-03-14 PROCEDURE — 84443 ASSAY THYROID STIM HORMONE: CPT

## 2024-03-14 PROCEDURE — 86317 IMMUNOASSAY INFECTIOUS AGENT: CPT

## 2024-03-14 PROCEDURE — RXMED WILLOW AMBULATORY MEDICATION CHARGE

## 2024-03-14 PROCEDURE — 76817 TRANSVAGINAL US OBSTETRIC: CPT

## 2024-03-14 PROCEDURE — 83021 HEMOGLOBIN CHROMOTOGRAPHY: CPT

## 2024-03-14 PROCEDURE — 86803 HEPATITIS C AB TEST: CPT

## 2024-03-14 PROCEDURE — 85027 COMPLETE CBC AUTOMATED: CPT

## 2024-03-14 PROCEDURE — 76801 OB US < 14 WKS SINGLE FETUS: CPT | Performed by: OBSTETRICS & GYNECOLOGY

## 2024-03-14 PROCEDURE — 36415 COLL VENOUS BLD VENIPUNCTURE: CPT

## 2024-03-14 PROCEDURE — 76817 TRANSVAGINAL US OBSTETRIC: CPT | Performed by: OBSTETRICS & GYNECOLOGY

## 2024-03-14 RX ORDER — ONDANSETRON 4 MG/1
4 TABLET, ORALLY DISINTEGRATING ORAL EVERY 8 HOURS PRN
Qty: 60 TABLET | Refills: 2 | Status: SHIPPED | OUTPATIENT
Start: 2024-03-14 | End: 2024-05-13

## 2024-03-14 RX ORDER — ACETAMINOPHEN 500 MG
1 TABLET ORAL DAILY
Qty: 1 EACH | Refills: 0 | Status: SHIPPED | OUTPATIENT
Start: 2024-03-14

## 2024-03-14 RX ORDER — LABETALOL 200 MG/1
200 TABLET, FILM COATED ORAL 2 TIMES DAILY
Qty: 120 TABLET | Refills: 3 | Status: SHIPPED | OUTPATIENT
Start: 2024-03-14 | End: 2024-03-26 | Stop reason: SDUPTHER

## 2024-03-14 RX ORDER — PYRIDOXINE HCL (VITAMIN B6) 25 MG
25 TABLET ORAL NIGHTLY
Qty: 90 TABLET | Refills: 0 | Status: SHIPPED | OUTPATIENT
Start: 2024-03-14 | End: 2024-06-12

## 2024-03-14 ASSESSMENT — ENCOUNTER SYMPTOMS
CONSTITUTIONAL NEGATIVE: 0
CARDIOVASCULAR NEGATIVE: 0
EYES NEGATIVE: 0
NEUROLOGICAL NEGATIVE: 0
RESPIRATORY NEGATIVE: 0
ALLERGIC/IMMUNOLOGIC NEGATIVE: 0
PSYCHIATRIC NEGATIVE: 0
GASTROINTESTINAL NEGATIVE: 0
MUSCULOSKELETAL NEGATIVE: 0
ENDOCRINE NEGATIVE: 0
HEMATOLOGIC/LYMPHATIC NEGATIVE: 0

## 2024-03-14 NOTE — ASSESSMENT & PLAN NOTE
- US today, viable, JOSE 10/28  - Feeling excited about pregnancy  - First tri labs ordered today  - Unsure about genetic testing, NT US - will discuss with partner, address at next visit  - Discussed return precautions for bleeding, abd pain - currently no sx, no concerns  Did not leave enough urine for culture and STI screening. Will need this at next visit

## 2024-03-14 NOTE — PROGRESS NOTES
[facility] Follow-up  3/14/2024       SUBJECTIVE    HPI: Shravan Astudillo is a 23 y.o.  at 7w3d based on US today here for RPNV. Spotting over the weekend just on toilet paper, no further bleeding. Nothing in the vagina at that time. No abn discharge. Some abd cramping, none currently.    Having n/v multiple days, feels like it doesn't stop when it starts. Not taking any meds for it. Able to keep down small meals. Taking tums for GERD but continues to have heartburn sx.    cHTN: taking labetalol 200 BID (was previously prescribed 400 BID). BP at home 130s/80s-90s, reports 90s diastolic <50% of the time. Cuff at home is small, has been taking BP on forearm. No HA, vision changes, SOB, CP, RUQ pain, LE edema    Depression: no meds. Mood good. No SI/HI    OBJECTIVE    Visit Vitals  /77   Wt (!) 173 kg (382 lb 6.4 oz)   LMP 01/15/2024   BMI 59.89 kg/m²   OB Status Pregnant   Smoking Status Never   BSA 2.86 m²      FHT: US today    ASSESSMENT & PLAN    Shravan Astudillo is a 23 y.o.  at Unknown here for the following concerns we addressed today:    Pre-existing essential hypertension during pregnancy in first trimester  - Home BP: reports 130s/80s-90s (90s <1/2 the time)  - Office BP: 130/77  - Continue labetalol 200 BID  - Baseline labs, echo, EKG ordered today  - New cuff sent, to bring to next visit to validate  - Diagnosed at age 13, recalls workup at that time neg. Reports renal artery US last year that was wnl (not seen in records) - will not pursue further workup at this time    Less than 8 weeks gestation of pregnancy  - US today, viable, JOSE 10/28  - Feeling excited about pregnancy  - First tri labs ordered today  - Unsure about genetic testing, NT US - will discuss with partner, address at next visit  - Discussed return precautions for bleeding, abd pain - currently no sx, no concerns    Nausea and vomiting in pregnancy prior to 22 weeks gestation  - Prescribed unisom, B6 scheduled. PRN zofran,  discussed can schedule if PRN is not sufficient  - Discussed return precautions: if unable to keep down liquids for a day or food for a few days    Gastroesophageal reflux during pregnancy in first trimester, antepartum  Prescribed pepcid    Depression affecting pregnancy in first trimester, antepartum  No SI/HI, discussed safety of meds in pregnancy and available resources if needed     Orders Placed This Encounter   Procedures    Urine Culture    Prenatal Screening Panel    Comprehensive Metabolic Panel    Protein, Urine Random (P:C Ratio)    Hemoglobin Identification with Path Review    TSH with reflex to Free T4 if abnormal    C. trachomatis + N. gonorrhoeae, Amplified    Type And Screen    ECG 12 lead (Ancillary Performed)     RTC in 2 weeks    Patient seen and evaluated with Dr. Juan Ramon Hernandez MD  PGY-3, Obstetrics and Gynecology    I saw and evaluated the patient. I personally obtained the key and critical portions of the history and physical exam or was physically present for key and critical portions performed by the resident/fellow. I reviewed the resident/fellow's documentation and discussed the patient with the resident/fellow. I agree with the resident/fellow's medical decision making as documented in the note.     Briefly,  at 7w3d ongoing pregnancy confirmed today. Normotensive. Prenatal labs and baseline preE labs ordered. Discussed location of care and patient would like to continue her care under [facility] supervision. 2 weeks for BP check.     Chandler Delatorre MD  [facility]

## 2024-03-14 NOTE — ASSESSMENT & PLAN NOTE
- Prescribed unisom, B6 scheduled. PRN zofran, discussed can schedule if PRN is not sufficient  - Discussed return precautions: if unable to keep down liquids for a day or food for a few days

## 2024-03-15 LAB
HEMOGLOBIN A2: 2.2 % (ref 2–3.5)
HEMOGLOBIN A: 96.2 % (ref 95.8–98)
HEMOGLOBIN F: 1.6 % (ref 0–2)
HEMOGLOBIN IDENTIFICATION INTERPRETATION: NORMAL
PATH REVIEW-HGB IDENTIFICATION: NORMAL

## 2024-03-20 ENCOUNTER — APPOINTMENT (OUTPATIENT)
Dept: OBSTETRICS AND GYNECOLOGY | Facility: CLINIC | Age: 24
End: 2024-03-20
Payer: COMMERCIAL

## 2024-03-24 PROBLEM — Z3A.10 10 WEEKS GESTATION OF PREGNANCY (HHS-HCC): Status: ACTIVE | Noted: 2024-02-29

## 2024-03-26 ENCOUNTER — ROUTINE PRENATAL (OUTPATIENT)
Dept: MATERNAL FETAL MEDICINE | Facility: CLINIC | Age: 24
End: 2024-03-26
Payer: COMMERCIAL

## 2024-03-26 VITALS
WEIGHT: 293 LBS | BODY MASS INDEX: 60.24 KG/M2 | HEART RATE: 84 BPM | DIASTOLIC BLOOD PRESSURE: 83 MMHG | SYSTOLIC BLOOD PRESSURE: 136 MMHG

## 2024-03-26 DIAGNOSIS — O99.611 GASTROESOPHAGEAL REFLUX DURING PREGNANCY IN FIRST TRIMESTER, ANTEPARTUM (HHS-HCC): ICD-10-CM

## 2024-03-26 DIAGNOSIS — O21.9 NAUSEA AND VOMITING IN PREGNANCY PRIOR TO 22 WEEKS GESTATION (HHS-HCC): ICD-10-CM

## 2024-03-26 DIAGNOSIS — K21.9 GASTROESOPHAGEAL REFLUX DURING PREGNANCY IN FIRST TRIMESTER, ANTEPARTUM (HHS-HCC): ICD-10-CM

## 2024-03-26 DIAGNOSIS — Z3A.10 10 WEEKS GESTATION OF PREGNANCY (HHS-HCC): ICD-10-CM

## 2024-03-26 DIAGNOSIS — O99.341 DEPRESSION AFFECTING PREGNANCY IN FIRST TRIMESTER, ANTEPARTUM (HHS-HCC): ICD-10-CM

## 2024-03-26 DIAGNOSIS — O10.011 PRE-EXISTING ESSENTIAL HYPERTENSION DURING PREGNANCY IN FIRST TRIMESTER (HHS-HCC): ICD-10-CM

## 2024-03-26 DIAGNOSIS — O99.211 OBESITY AFFECTING PREGNANCY IN FIRST TRIMESTER, UNSPECIFIED OBESITY TYPE (HHS-HCC): ICD-10-CM

## 2024-03-26 DIAGNOSIS — Z3A.09 9 WEEKS GESTATION OF PREGNANCY (HHS-HCC): Primary | ICD-10-CM

## 2024-03-26 DIAGNOSIS — F32.A DEPRESSION AFFECTING PREGNANCY IN FIRST TRIMESTER, ANTEPARTUM (HHS-HCC): ICD-10-CM

## 2024-03-26 PROCEDURE — 99214 OFFICE O/P EST MOD 30 MIN: CPT | Performed by: OBSTETRICS & GYNECOLOGY

## 2024-03-26 PROCEDURE — 87800 DETECT AGNT MULT DNA DIREC: CPT | Performed by: OBSTETRICS & GYNECOLOGY

## 2024-03-26 PROCEDURE — 99214 OFFICE O/P EST MOD 30 MIN: CPT | Mod: GC | Performed by: OBSTETRICS & GYNECOLOGY

## 2024-03-26 PROCEDURE — 87086 URINE CULTURE/COLONY COUNT: CPT | Performed by: OBSTETRICS & GYNECOLOGY

## 2024-03-26 RX ORDER — LABETALOL 200 MG/1
400 TABLET, FILM COATED ORAL 2 TIMES DAILY
Qty: 120 TABLET | Refills: 3 | Status: SHIPPED | OUTPATIENT
Start: 2024-03-26 | End: 2025-03-26

## 2024-03-26 RX ORDER — ASPIRIN 81 MG/1
162 TABLET ORAL DAILY
Qty: 120 TABLET | Refills: 2 | Status: SHIPPED | OUTPATIENT
Start: 2024-03-26 | End: 2025-03-26

## 2024-03-26 ASSESSMENT — PAIN - FUNCTIONAL ASSESSMENT: PAIN_FUNCTIONAL_ASSESSMENT: 0-10

## 2024-03-26 ASSESSMENT — PAIN SCALES - GENERAL: PAINLEVEL_OUTOF10: 0 - NO PAIN

## 2024-03-26 NOTE — ASSESSMENT & PLAN NOTE
Ucx and GC/CT sent today. cfDNA, SMA/CF carrier screening ordered today - to be done after 10w. Pt does not want to know fetal sex yet, will not look at results, but sent sex chromosome analysis for complete eval. NT to be scheduled with next visit. bASA to be started next week

## 2024-03-26 NOTE — PROGRESS NOTES
[facility] Follow-up  3/26/2024         SUBJECTIVE    HPI: hSravan Astudillo is a 23 y.o.  at 9w1d here for RPNV. No concerns today. No HA, vision changes, CP, RUQ pain, LE edema. Occasional exertional SOB. No VB or abd pain. No urinary or GI sx, n/v improving    OBJECTIVE    Visit Vitals  /83   Pulse 84   Wt (!) 174 kg (384 lb 9.6 oz)   LMP 01/15/2024   BMI 60.24 kg/m²   OB Status Pregnant   Smoking Status Never   BSA 2.87 m²      FHT: 176 BSUS    ASSESSMENT & PLAN    Shravan Astudillo is a 23 y.o.  at 9w1d here for the following concerns we addressed today:    Pre-existing essential hypertension during pregnancy in first trimester  - Home BP: reports 130s-160s/70s-90s. Does have several elevated BP. Cuffs validated this visit  - Office BP: 136/83  - Increase to labetalol 400 BID  - Baseline labs wnl  - Echo, EKG to be done  - Occasional SOB, will eval w echo  - bASA ordered, to start at 10w    Gastroesophageal reflux during pregnancy in first trimester, antepartum  Sx well managed    Obesity affecting pregnancy in first trimester  Discussed subclinical hypothyroidism. No intervention indicated at this time. Repeat TSH/T4 in 2nd tri    Depression affecting pregnancy in first trimester, antepartum  No SI/HI, mood good currently    9 weeks gestation of pregnancy  Ucx and GC/CT sent today. cfDNA, SMA/CF carrier screening ordered today - to be done after 10w. Pt does not want to know fetal sex yet, will not look at results, but sent sex chromosome analysis for complete eval. NT to be scheduled with next visit. bASA to be started next week     Orders Placed This Encounter   Procedures    Myriad Prequel Prenatal Screen    Myriad Foresight Carrier Screen (CF + SMA)    C. Trachomatis / N. Gonorrhoeae, Amplified Detection    Transthoracic Echo (TTE) Complete     RTC in 4 weeks    Patient seen and evaluated with Dr. Hu Hernandez MD  PGY-3, Obstetrics and Gynecology  [facility] Pager: [phone]

## 2024-03-26 NOTE — ASSESSMENT & PLAN NOTE
Discussed subclinical hypothyroidism. No intervention indicated at this time. Repeat TSH/T4 in 2nd tri

## 2024-03-26 NOTE — ASSESSMENT & PLAN NOTE
- Home BP: reports 130s-160s/70s-90s. Does have several elevated BP. Cuffs validated this visit  - Office BP: 136/83  - Increase to labetalol 400 BID  - Baseline labs wnl  - Echo, EKG to be done  - Occasional SOB, will eval w echo  - bASA ordered, to start at 10w

## 2024-03-27 LAB
BACTERIA UR CULT: NORMAL
C TRACH RRNA SPEC QL NAA+PROBE: NEGATIVE
N GONORRHOEA DNA SPEC QL PROBE+SIG AMP: NEGATIVE

## 2024-04-16 ENCOUNTER — APPOINTMENT (OUTPATIENT)
Dept: LAB | Facility: LAB | Age: 24
End: 2024-04-16

## 2024-04-16 ENCOUNTER — HOSPITAL ENCOUNTER (OUTPATIENT)
Dept: CARDIOLOGY | Facility: HOSPITAL | Age: 24
Discharge: HOME | End: 2024-04-16
Payer: COMMERCIAL

## 2024-04-16 DIAGNOSIS — I10 ESSENTIAL (PRIMARY) HYPERTENSION: ICD-10-CM

## 2024-04-16 DIAGNOSIS — Z3A.10 10 WEEKS GESTATION OF PREGNANCY (HHS-HCC): ICD-10-CM

## 2024-04-16 DIAGNOSIS — O10.011 PRE-EXISTING ESSENTIAL HYPERTENSION DURING PREGNANCY IN FIRST TRIMESTER (HHS-HCC): ICD-10-CM

## 2024-04-16 LAB
AORTIC VALVE MEAN GRADIENT: 5.3 MMHG
AORTIC VALVE PEAK VELOCITY: 1.42 M/S
ATRIAL RATE: 77 BPM
AV PEAK GRADIENT: 8 MMHG
AVA (PEAK VEL): 3.29 CM2
AVA (VTI): 3.28 CM2
EJECTION FRACTION APICAL 4 CHAMBER: 68.5
LEFT ATRIUM VOLUME AREA LENGTH INDEX BSA: 33.5 ML/M2
LEFT VENTRICLE INTERNAL DIMENSION DIASTOLE: 4.64 CM (ref 3.5–6)
LEFT VENTRICULAR OUTFLOW TRACT DIAMETER: 2.14 CM
LV EJECTION FRACTION BIPLANE: 69 %
MITRAL VALVE E/A RATIO: 1.47
MITRAL VALVE E/E' RATIO: 8.4
P AXIS: 40 DEGREES
P OFFSET: 190 MS
P ONSET: 151 MS
PR INTERVAL: 132 MS
Q ONSET: 217 MS
QRS COUNT: 12 BEATS
QRS DURATION: 90 MS
QT INTERVAL: 402 MS
QTC CALCULATION(BAZETT): 454 MS
QTC FREDERICIA: 436 MS
R AXIS: 88 DEGREES
T AXIS: 40 DEGREES
T OFFSET: 418 MS
VENTRICULAR RATE: 77 BPM

## 2024-04-16 PROCEDURE — 93306 TTE W/DOPPLER COMPLETE: CPT | Performed by: INTERNAL MEDICINE

## 2024-04-16 PROCEDURE — 93306 TTE W/DOPPLER COMPLETE: CPT

## 2024-04-16 PROCEDURE — 93005 ELECTROCARDIOGRAM TRACING: CPT

## 2024-04-16 PROCEDURE — 36415 COLL VENOUS BLD VENIPUNCTURE: CPT

## 2024-04-22 PROBLEM — Z3A.13 13 WEEKS GESTATION OF PREGNANCY (HHS-HCC): Status: ACTIVE | Noted: 2024-02-29

## 2024-04-23 ENCOUNTER — ROUTINE PRENATAL (OUTPATIENT)
Dept: MATERNAL FETAL MEDICINE | Facility: CLINIC | Age: 24
End: 2024-04-23
Payer: COMMERCIAL

## 2024-04-23 ENCOUNTER — HOSPITAL ENCOUNTER (OUTPATIENT)
Dept: RADIOLOGY | Facility: CLINIC | Age: 24
Discharge: HOME | End: 2024-04-23
Payer: COMMERCIAL

## 2024-04-23 VITALS
BODY MASS INDEX: 61.24 KG/M2 | WEIGHT: 293 LBS | HEART RATE: 98 BPM | DIASTOLIC BLOOD PRESSURE: 81 MMHG | SYSTOLIC BLOOD PRESSURE: 129 MMHG

## 2024-04-23 DIAGNOSIS — K21.9 GASTROESOPHAGEAL REFLUX DURING PREGNANCY IN FIRST TRIMESTER, ANTEPARTUM (HHS-HCC): ICD-10-CM

## 2024-04-23 DIAGNOSIS — O99.341 DEPRESSION AFFECTING PREGNANCY IN FIRST TRIMESTER, ANTEPARTUM (HHS-HCC): ICD-10-CM

## 2024-04-23 DIAGNOSIS — F32.A DEPRESSION AFFECTING PREGNANCY IN FIRST TRIMESTER, ANTEPARTUM (HHS-HCC): ICD-10-CM

## 2024-04-23 DIAGNOSIS — O99.611 GASTROESOPHAGEAL REFLUX DURING PREGNANCY IN FIRST TRIMESTER, ANTEPARTUM (HHS-HCC): ICD-10-CM

## 2024-04-23 DIAGNOSIS — O10.011 PRE-EXISTING ESSENTIAL HYPERTENSION DURING PREGNANCY IN FIRST TRIMESTER (HHS-HCC): Primary | ICD-10-CM

## 2024-04-23 DIAGNOSIS — Z3A.13 13 WEEKS GESTATION OF PREGNANCY (HHS-HCC): ICD-10-CM

## 2024-04-23 DIAGNOSIS — Z3A.01 LESS THAN 8 WEEKS GESTATION OF PREGNANCY (HHS-HCC): ICD-10-CM

## 2024-04-23 DIAGNOSIS — O21.9 NAUSEA AND VOMITING IN PREGNANCY PRIOR TO 22 WEEKS GESTATION (HHS-HCC): ICD-10-CM

## 2024-04-23 PROCEDURE — 99214 OFFICE O/P EST MOD 30 MIN: CPT | Mod: GC,25 | Performed by: OBSTETRICS & GYNECOLOGY

## 2024-04-23 PROCEDURE — 76816 OB US FOLLOW-UP PER FETUS: CPT

## 2024-04-23 PROCEDURE — 76816 OB US FOLLOW-UP PER FETUS: CPT | Performed by: OBSTETRICS & GYNECOLOGY

## 2024-04-23 PROCEDURE — 99214 OFFICE O/P EST MOD 30 MIN: CPT | Performed by: OBSTETRICS & GYNECOLOGY

## 2024-04-23 RX ORDER — PANTOPRAZOLE SODIUM 40 MG/1
40 TABLET, DELAYED RELEASE ORAL
Qty: 30 TABLET | Refills: 11 | Status: SHIPPED | OUTPATIENT
Start: 2024-04-23 | End: 2025-04-23

## 2024-04-23 ASSESSMENT — PAIN SCALES - GENERAL: PAINLEVEL_OUTOF10: 0 - NO PAIN

## 2024-04-23 ASSESSMENT — PAIN - FUNCTIONAL ASSESSMENT: PAIN_FUNCTIONAL_ASSESSMENT: 0-10

## 2024-04-23 NOTE — PROGRESS NOTES
MFM Follow-up  2024         SUBJECTIVE    HPI: Shravan Astudillo is a 23 y.o.  at 13w1d here for RPNV in s/o cHTN. Doing well, no more N/V.      OBJECTIVE    Visit Vitals  /81   Pulse 98   Wt (!) 177 kg (391 lb)   LMP 01/15/2024   BMI 61.24 kg/m²   OB Status Pregnant   Smoking Status Never   BSA 2.89 m²      First BP: 140/98    FHT: US    ASSESSMENT & PLAN    Shravan Astudillo is a 23 y.o.  at 13w1d here for the following concerns we addressed today:    Pre-existing essential hypertension during pregnancy in first trimester ([facility])  - Currently on labetalol 400 bid  - Bps at home mostly 130s/70s-80s  - Today 140/98, repeat 129/81  - cont ASA  - Cont lab 400 bid    Gastroesophageal reflux during pregnancy in first trimester, antepartum ([facility])  - Heartburn worsening, would like to try PPI  - rx'd pantoprazole    Nausea and vomiting in pregnancy prior to 22 weeks gestation ([facility])  - significantly improved, rare antiemetic use    Depression affecting pregnancy in first trimester, antepartum ([facility])  - reports feeling slightly down recently  - interested in starting therapy -> referrals given  - not interested in starting a med a time  - Denies SI/HI    13 weeks gestation of pregnancy ([facility])  NT today - grossly normal NT inadequate. cfDNA pending     RTC in 4 weeks. Pt will call/message if home BPs are rising/mild range.    Patient seen and evaluated with Dr. Juan Ramon Nina MD

## 2024-04-23 NOTE — ASSESSMENT & PLAN NOTE
- reports feeling slightly down recently  - interested in starting therapy -> referrals given  - not interested in starting a med a time  - Denies SI/HI

## 2024-04-23 NOTE — ASSESSMENT & PLAN NOTE
- Currently on labetalol 400 bid  - Bps at home mostly 130s/70s-80s  - Today 140/98, repeat 129/81  - cont ASA  - Cont lab 400 bid

## 2024-04-25 LAB
SCAN RESULT: NORMAL
SCAN RESULT: NORMAL

## 2024-05-13 ENCOUNTER — TELEPHONE (OUTPATIENT)
Dept: OBSTETRICS AND GYNECOLOGY | Facility: CLINIC | Age: 24
End: 2024-05-13
Payer: COMMERCIAL

## 2024-05-13 DIAGNOSIS — O10.011 PRE-EXISTING ESSENTIAL HYPERTENSION DURING PREGNANCY IN FIRST TRIMESTER (HHS-HCC): ICD-10-CM

## 2024-05-13 NOTE — TELEPHONE ENCOUNTER
----- Message from Shravan Astudillo sent at 5/11/2024  8:01 PM EDT -----  Regarding: HBP Notification  Contact: [phone]  Hi, I notice you're the doctor I'll be seeing during my next appointment on Tuesday the 21st. I just wanted to share that I have been checking my blood pressure twice daily, once in the morning and once at night. I am just trying to be proactive here... my morning readings are good, usually 120-130/70-80's. My night readings I was doing after taking my medication because I would do both around the same time. I took my measurement before taking my meds the last 3 days or so and notice it's been higher before taking my night medication around 140/80, then will lower after taking my meds back to a good range. I don't normally drink coffee anymore but I did have a small glass today only, maybe an hour before taking my measurement which may have contributed to a higher reading. Last few nights before taking my night meds have been 143/85, 140/78, 140/80. I plan to check 3 times a day now rather than 2x daily until I see you to see if it's consistently high midday. But I am wondering if   maybe taking my medication 3 times a day would help keep it consistent, I'm not sure if it would need to be a lower dose or not if I do 3x daily though. I did just want to notify you about the changes and if I need to clarify anything please reach out. Looking forward to seeing you. Thanks!

## 2024-05-13 NOTE — TELEPHONE ENCOUNTER
Reviewed with Dr. Delatorre, recommends increasing labetalol 400mg to TID.    Called patient, identified by name and .  Notified patient of recommendation to increase labetalol to TID, continue checking BPs at home, and bring BP log to visit 24. Patient verbalized understanding.  RN phone number provided for any further questions or concerns.

## 2024-05-17 NOTE — ASSESSMENT & PLAN NOTE
- BP in office today: 133/79  - BP's at home: normotensive, one mild range since uptitrating her labetalol  - continue labetalol 400 TID  - continue bASA

## 2024-05-21 ENCOUNTER — ROUTINE PRENATAL (OUTPATIENT)
Dept: MATERNAL FETAL MEDICINE | Facility: CLINIC | Age: 24
End: 2024-05-21
Payer: COMMERCIAL

## 2024-05-21 ENCOUNTER — LAB (OUTPATIENT)
Dept: LAB | Facility: LAB | Age: 24
End: 2024-05-21
Payer: COMMERCIAL

## 2024-05-21 VITALS
SYSTOLIC BLOOD PRESSURE: 133 MMHG | DIASTOLIC BLOOD PRESSURE: 79 MMHG | BODY MASS INDEX: 61.4 KG/M2 | WEIGHT: 293 LBS | HEART RATE: 90 BPM

## 2024-05-21 DIAGNOSIS — O99.211 OBESITY AFFECTING PREGNANCY IN FIRST TRIMESTER, UNSPECIFIED OBESITY TYPE (HHS-HCC): ICD-10-CM

## 2024-05-21 DIAGNOSIS — F32.A DEPRESSION AFFECTING PREGNANCY IN FIRST TRIMESTER, ANTEPARTUM (HHS-HCC): ICD-10-CM

## 2024-05-21 DIAGNOSIS — O99.341 DEPRESSION AFFECTING PREGNANCY IN FIRST TRIMESTER, ANTEPARTUM (HHS-HCC): ICD-10-CM

## 2024-05-21 DIAGNOSIS — K21.9 GASTROESOPHAGEAL REFLUX DURING PREGNANCY IN FIRST TRIMESTER, ANTEPARTUM (HHS-HCC): ICD-10-CM

## 2024-05-21 DIAGNOSIS — O99.611 GASTROESOPHAGEAL REFLUX DURING PREGNANCY IN FIRST TRIMESTER, ANTEPARTUM (HHS-HCC): ICD-10-CM

## 2024-05-21 DIAGNOSIS — O10.011 PRE-EXISTING ESSENTIAL HYPERTENSION DURING PREGNANCY IN FIRST TRIMESTER (HHS-HCC): Primary | ICD-10-CM

## 2024-05-21 DIAGNOSIS — Z3A.17 17 WEEKS GESTATION OF PREGNANCY (HHS-HCC): ICD-10-CM

## 2024-05-21 LAB — TSH SERPL-ACNC: 2.88 MIU/L (ref 0.44–3.98)

## 2024-05-21 PROCEDURE — 36415 COLL VENOUS BLD VENIPUNCTURE: CPT

## 2024-05-21 PROCEDURE — 99214 OFFICE O/P EST MOD 30 MIN: CPT | Performed by: STUDENT IN AN ORGANIZED HEALTH CARE EDUCATION/TRAINING PROGRAM

## 2024-05-21 PROCEDURE — 84443 ASSAY THYROID STIM HORMONE: CPT

## 2024-05-21 ASSESSMENT — PAIN SCALES - GENERAL: PAINLEVEL_OUTOF10: 0 - NO PAIN

## 2024-05-21 ASSESSMENT — PAIN - FUNCTIONAL ASSESSMENT: PAIN_FUNCTIONAL_ASSESSMENT: 0-10

## 2024-05-21 NOTE — PROGRESS NOTES
MFM Follow-up  2024         SUBJECTIVE    HPI: Shravan Astudillo is a 23 y.o.  at 17w1d here for RPNV. Patient reports a fall last Friday. She missed the last step on her stairs and fell on all fours. Denies head trauma, abdominal trauma, or LOC. She denies any pain from the fall. She did not go to the ED. She denies contractions, bleeding, or LOF. Has not felt baby move yet this pregnancy.    Denies headaches, vision changes, chest pain, shortness of breath, or RUQ pain.      OBJECTIVE    Visit Vitals  /79   Pulse 90   Wt (!) 178 kg (392 lb)   LMP 01/15/2024   BMI 61.40 kg/m²   OB Status Pregnant   Smoking Status Never   BSA 2.9 m²        FHT: 145bpm    ASSESSMENT & PLAN    Shravan Astudillo is a 23 y.o.  at 17w1d here for the following concerns we addressed today:    Pre-existing essential hypertension during pregnancy in first trimester ([facility])  - BP in office today: 133/79  - BP's at home: normotensive, one mild range since uptitrating her labetalol  - continue labetalol 400 TID  - continue bASA    Gastroesophageal reflux during pregnancy in first trimester, antepartum ([facility])  Well controlled on pantoprazole    Depression affecting pregnancy in first trimester, antepartum ([facility])  Mood is stable. No SI/HI. Still considering therapy.     Obesity affecting pregnancy in first trimester ([facility])  Nutrition referral to [facility] made.     Orders Placed This Encounter   Procedures    US MAC OB imaging order    TSH with reflex to Free T4 if abnormal       RTC in 3 weeks for anatomy US and FUV.    Seen and discussed w/ Dr. Couch.  Cheyenne Negron MD PGY-1  Obstetrics & Gynecology

## 2024-05-23 ENCOUNTER — TELEPHONE (OUTPATIENT)
Dept: MATERNAL FETAL MEDICINE | Facility: CLINIC | Age: 24
End: 2024-05-23
Payer: COMMERCIAL

## 2024-05-23 NOTE — TELEPHONE ENCOUNTER
Called to schedule Nutrition consult. Left voicemail message with office contact information: [phone]

## 2024-05-24 ENCOUNTER — APPOINTMENT (OUTPATIENT)
Dept: PRIMARY CARE | Facility: CLINIC | Age: 24
End: 2024-05-24
Payer: COMMERCIAL

## 2024-05-24 ENCOUNTER — TELEPHONE (OUTPATIENT)
Dept: OBSTETRICS AND GYNECOLOGY | Facility: CLINIC | Age: 24
End: 2024-05-24
Payer: COMMERCIAL

## 2024-05-24 DIAGNOSIS — O26.899 INCREASED URINARY FREQUENCY DURING PREGNANCY (HHS-HCC): Primary | ICD-10-CM

## 2024-05-24 DIAGNOSIS — R30.0 BURNING WITH URINATION: ICD-10-CM

## 2024-05-24 DIAGNOSIS — R35.0 INCREASED URINARY FREQUENCY DURING PREGNANCY (HHS-HCC): Primary | ICD-10-CM

## 2024-05-24 NOTE — TELEPHONE ENCOUNTER
Called patient, identified by name and .  Patient reports urinary frequency and burning with urination.   Reviewed with Dr. Delatorre, urine culture order placed.   Patient aware to go to any  lab to leave urine sample.   RN and office phone number provided for any further questions or concerns.

## 2024-05-24 NOTE — TELEPHONE ENCOUNTER
----- Message from Shravan Astudillo sent at 5/24/2024  3:08 PM EDT -----  Regarding: Possible uti  Contact: [phone]  Hi! I am reaching out because I may have a uti and normally I would scheudle with my primary but w the pregnancy I was wondering if this is something I'd need to schedule with you?   Would I be able to take antibiotics if I test for uti and does the uti or antibiotics affect the baby?  No Blood in urine, just frequent trips to the bathroom with little urine coming out and burning. About 2 full days today being day 3.     Thanks!

## 2024-05-28 ENCOUNTER — LAB (OUTPATIENT)
Dept: LAB | Facility: LAB | Age: 24
End: 2024-05-28
Payer: COMMERCIAL

## 2024-05-28 DIAGNOSIS — R39.9 URINARY TRACT INFECTION SYMPTOMS: ICD-10-CM

## 2024-05-28 DIAGNOSIS — O26.899 INCREASED URINARY FREQUENCY DURING PREGNANCY (HHS-HCC): ICD-10-CM

## 2024-05-28 DIAGNOSIS — R31.0 GROSS HEMATURIA: ICD-10-CM

## 2024-05-28 DIAGNOSIS — R30.0 BURNING WITH URINATION: ICD-10-CM

## 2024-05-28 DIAGNOSIS — R35.0 INCREASED URINARY FREQUENCY DURING PREGNANCY (HHS-HCC): ICD-10-CM

## 2024-05-28 PROCEDURE — 87186 SC STD MICRODIL/AGAR DIL: CPT

## 2024-05-28 PROCEDURE — 87086 URINE CULTURE/COLONY COUNT: CPT

## 2024-05-29 RX ORDER — NITROFURANTOIN 25; 75 MG/1; MG/1
100 CAPSULE ORAL 2 TIMES DAILY
Qty: 10 CAPSULE | Refills: 0 | Status: SHIPPED | OUTPATIENT
Start: 2024-05-29 | End: 2024-06-11 | Stop reason: WASHOUT

## 2024-05-30 DIAGNOSIS — O23.42 URINARY TRACT INFECTION IN MOTHER DURING SECOND TRIMESTER OF PREGNANCY (HHS-HCC): Primary | ICD-10-CM

## 2024-05-30 RX ORDER — AMOXICILLIN AND CLAVULANATE POTASSIUM 875; 125 MG/1; MG/1
875 TABLET, FILM COATED ORAL 2 TIMES DAILY
Qty: 10 TABLET | Refills: 0 | Status: SHIPPED | OUTPATIENT
Start: 2024-05-30 | End: 2024-06-11 | Stop reason: WASHOUT

## 2024-05-31 LAB — BACTERIA UR CULT: ABNORMAL

## 2024-06-09 PROBLEM — O99.212 OBESITY AFFECTING PREGNANCY IN SECOND TRIMESTER (HHS-HCC): Status: ACTIVE | Noted: 2024-02-29

## 2024-06-09 PROBLEM — Z3A.20 20 WEEKS GESTATION OF PREGNANCY (HHS-HCC): Status: ACTIVE | Noted: 2024-02-29

## 2024-06-09 PROBLEM — O10.012 PRE-EXISTING ESSENTIAL HYPERTENSION DURING PREGNANCY IN SECOND TRIMESTER (HHS-HCC): Status: ACTIVE | Noted: 2024-02-29

## 2024-06-09 PROBLEM — O99.612 GASTROESOPHAGEAL REFLUX DURING PREGNANCY IN SECOND TRIMESTER, ANTEPARTUM (HHS-HCC): Status: ACTIVE | Noted: 2023-10-15

## 2024-06-09 PROBLEM — O99.211 OBESITY AFFECTING PREGNANCY IN FIRST TRIMESTER (HHS-HCC): Status: RESOLVED | Noted: 2024-02-29 | Resolved: 2024-06-09

## 2024-06-09 PROBLEM — O99.342 DEPRESSION AFFECTING PREGNANCY IN SECOND TRIMESTER, ANTEPARTUM (HHS-HCC): Status: ACTIVE | Noted: 2023-09-17

## 2024-06-11 ENCOUNTER — ROUTINE PRENATAL (OUTPATIENT)
Dept: MATERNAL FETAL MEDICINE | Facility: CLINIC | Age: 24
End: 2024-06-11
Payer: COMMERCIAL

## 2024-06-11 ENCOUNTER — APPOINTMENT (OUTPATIENT)
Dept: RADIOLOGY | Facility: CLINIC | Age: 24
End: 2024-06-11
Payer: COMMERCIAL

## 2024-06-11 ENCOUNTER — HOSPITAL ENCOUNTER (OUTPATIENT)
Dept: RADIOLOGY | Facility: CLINIC | Age: 24
Discharge: HOME | End: 2024-06-11
Payer: COMMERCIAL

## 2024-06-11 ENCOUNTER — NUTRITION (OUTPATIENT)
Dept: OBSTETRICS AND GYNECOLOGY | Facility: CLINIC | Age: 24
End: 2024-06-11

## 2024-06-11 VITALS — BODY MASS INDEX: 61.33 KG/M2 | SYSTOLIC BLOOD PRESSURE: 115 MMHG | WEIGHT: 293 LBS | DIASTOLIC BLOOD PRESSURE: 74 MMHG

## 2024-06-11 DIAGNOSIS — M54.31 BILATERAL SCIATICA: ICD-10-CM

## 2024-06-11 DIAGNOSIS — Z3A.20 20 WEEKS GESTATION OF PREGNANCY (HHS-HCC): Primary | ICD-10-CM

## 2024-06-11 DIAGNOSIS — I10 ESSENTIAL HYPERTENSION: ICD-10-CM

## 2024-06-11 DIAGNOSIS — G56.00 CARPAL TUNNEL SYNDROME DURING PREGNANCY (HHS-HCC): ICD-10-CM

## 2024-06-11 DIAGNOSIS — O99.212 OBESITY AFFECTING PREGNANCY IN SECOND TRIMESTER (HHS-HCC): ICD-10-CM

## 2024-06-11 DIAGNOSIS — Z3A.01 LESS THAN 8 WEEKS GESTATION OF PREGNANCY (HHS-HCC): ICD-10-CM

## 2024-06-11 DIAGNOSIS — O26.899 CARPAL TUNNEL SYNDROME DURING PREGNANCY (HHS-HCC): ICD-10-CM

## 2024-06-11 DIAGNOSIS — M54.32 BILATERAL SCIATICA: ICD-10-CM

## 2024-06-11 DIAGNOSIS — O10.912 CHRONIC HYPERTENSION COMPLICATING OR REASON FOR CARE DURING PREGNANCY, SECOND TRIMESTER (HHS-HCC): ICD-10-CM

## 2024-06-11 PROBLEM — O23.42 URINARY TRACT INFECTION IN MOTHER DURING SECOND TRIMESTER OF PREGNANCY (HHS-HCC): Status: ACTIVE | Noted: 2024-06-11

## 2024-06-11 PROBLEM — M54.30 SCIATICA: Status: ACTIVE | Noted: 2024-06-11

## 2024-06-11 LAB
POC APPEARANCE, URINE: CLEAR
POC BILIRUBIN, URINE: NEGATIVE
POC BLOOD, URINE: NEGATIVE
POC COLOR, URINE: YELLOW
POC GLUCOSE, URINE: NEGATIVE MG/DL
POC KETONES, URINE: NEGATIVE MG/DL
POC LEUKOCYTES, URINE: NEGATIVE
POC NITRITE,URINE: NEGATIVE
POC PH, URINE: 7.5 PH
POC PROTEIN, URINE: NEGATIVE MG/DL
POC SPECIFIC GRAVITY, URINE: 1.01
POC UROBILINOGEN, URINE: 0.2 EU/DL

## 2024-06-11 PROCEDURE — 76811 OB US DETAILED SNGL FETUS: CPT | Performed by: STUDENT IN AN ORGANIZED HEALTH CARE EDUCATION/TRAINING PROGRAM

## 2024-06-11 PROCEDURE — 87086 URINE CULTURE/COLONY COUNT: CPT | Performed by: OBSTETRICS & GYNECOLOGY

## 2024-06-11 PROCEDURE — 99214 OFFICE O/P EST MOD 30 MIN: CPT | Performed by: OBSTETRICS & GYNECOLOGY

## 2024-06-11 PROCEDURE — 81003 URINALYSIS AUTO W/O SCOPE: CPT | Mod: QW | Performed by: OBSTETRICS & GYNECOLOGY

## 2024-06-11 PROCEDURE — 99214 OFFICE O/P EST MOD 30 MIN: CPT | Mod: GC | Performed by: OBSTETRICS & GYNECOLOGY

## 2024-06-11 PROCEDURE — 76811 OB US DETAILED SNGL FETUS: CPT

## 2024-06-11 NOTE — ASSESSMENT & PLAN NOTE
-Recommend overnight usage of wrist brace and Tylenol PRN  -Consider referral to orthopedic surgery for potential steroid injection if symptoms worsen   -Work letter for reduced shift length and more frequent breaks

## 2024-06-11 NOTE — PROGRESS NOTES
Nutrition Assessment     Reason for Visit:  Shravan Astudillo is a 23 y.o. female who presents for general nutrition in pregnancy.       Food And Nutrient Intake:  Food and Nutrient History  Food and Nutrient History: Pt seen in exam room today. Per pt: met w/[facility] prior to pregnancy. Was considering bariatric surgery. Pt had no specific questions or concerns today. Had general inquiry about safety of deli meat during pregnancy. (Discussed w/pt.) Drinking mostly water. Pt states that she's doing fine with protein intake.        OB Nutrition Intake  Weeks of Gestation: 20-1  Pre-Pregnancy Weight: ~379 lbs  Pre-Pregnancy BMI: 59.3  Overall Weight Change in Pregnancy: 12 lb wt gain to-date  Assessment of Weight Change: Within targets. W/pre-pregnancy BMI of 30.0 or greater, total recommended weight gain is 11-20 pounds, with a recommended 2nd and 3rd trimester weight gain of 0.4 - 0.6 pounds per week.       Nutrition Diagnosis      Nutrition Diagnosis  Patient has Nutrition Diagnosis: Yes  Nutrition Diagnosis 1: Food and nutrition related knowledge deficit  Related to (1): lack of prior/recent nutrition counseling r/t nutrition in pregnancy  As Evidenced by (1): intial nutrition consult    Nutrition Interventions/Recommendations        Pt Education Material Provided: Pregnancy Nutrition Plate; Yogurt Options; Sample Meals and Snacks.        Nutrition Monitoring and Evaluation      Follow-up as needed.       Time Spent  Prep time on day of patient encounter: 5 minutes  Time spent directly with patient, family or caregiver: 10 minutes  Additional Time Spent on Patient Care Activities: 0 minutes  Documentation Time: 10 minutes  Other Time Spent: 0 minutes  Total: 25 minutes

## 2024-06-11 NOTE — ASSESSMENT & PLAN NOTE
-Ordered urine culture for SHAZIA   -Recommended good hydration to prevent discomfort/pain with urination

## 2024-06-11 NOTE — PROGRESS NOTES
MFM Follow-up  2024    SUBJECTIVE    HPI: Shravan Astudillo is a 23 y.o.  at 20w0d here for RPNV. Denies contractions, bleeding, or LOF. Reports normal fetal movement.      OBJECTIVE  Visit Vitals  /74 Comment: HR 88   Wt (!) 178 kg (391 lb 9.6 oz)   LMP 01/15/2024   BMI 61.33 kg/m²   OB Status Pregnant   Smoking Status Never   BSA 2.9 m²      FHT: US today    ASSESSMENT & PLAN    Shravan Astudillo is a 23 y.o.  at 20w0d here for the following concerns we addressed today:    20 weeks gestation of pregnancy ([facility])  Anatomy US     Pre-existing essential hypertension during pregnancy in second trimester ([facility])  BP in office today: 115/74  BP's at home: normotensive on her home log (avg 135/75)  continue labetalol 400 TID  continue bASA       Gastroesophageal reflux during pregnancy in second trimester, antepartum ([facility])  Continue Pantaprazole    Sciatica  -Work note for shorter shifts, more frequent breaks and access to working from seated position     Depression affecting pregnancy in second trimester, antepartum ([facility])  On no medications  Currently describes good mood    Carpal tunnel syndrome during pregnancy ([facility])  -Recommend overnight usage of wrist brace and Tylenol PRN  -Consider referral to orthopedic surgery for potential steroid injection if symptoms worsen   -Work letter for reduced shift length and more frequent breaks     Urinary tract infection in mother during second trimester of pregnancy ([facility])  -Ordered urine culture for SHAZIA   -Recommended good hydration to prevent discomfort/pain with urination      Orders Placed This Encounter   Procedures    Splint     Wrist splint for right carpal tunnel syndrome    Splint     Wrist splint for left carpal tunnel syndrome    Urine Culture     Standing Status:   Future     Number of Occurrences:   1     Standing Expiration Date:   2024     Order Specific Question:   Release result to [place]     Answer:   Immediate  [1]    CBC Anemia Panel With Reflex, Pregnancy     Standing Status:   Future     Standing Expiration Date:   6/11/2025     Order Specific Question:   Release result to MyChart     Answer:   Immediate [1]    Syphilis Screen with Reflex     Standing Status:   Future     Standing Expiration Date:   6/11/2025     Order Specific Question:   Release result to MyChart     Answer:   Immediate [1]    Glucose, 1 Hour Screen, Pregnancy     Standing Status:   Future     Standing Expiration Date:   6/11/2025     Order Specific Question:   Release result to MyChart     Answer:   Immediate [1]    POCT UA Automated manually resulted     Order Specific Question:   Release result to MyChart     Answer:   Immediate [1]        RTC in 3 weeks for M visit and US    Patient seen and evaluated with Dr. Hu Bergeron, MS3  Yue Mayo MD PGY1  I saw and evaluated the patient with the medical student. I have made any necessary modifications within the text.

## 2024-06-11 NOTE — ASSESSMENT & PLAN NOTE
BP in office today: 115/74  BP's at home: normotensive on her home log (avg 135/75)  continue labetalol 400 TID  continue bASA      Is This A New Presentation, Or A Follow-Up?: Skin Lesions

## 2024-06-11 NOTE — LETTER
June 13, 2024     Patient: Shravan Astudillo   YOB: 2000   Date of Visit: 6/11/2024       To Whom It May Concern:    It is my medical opinion Shravan Astudillo  would benefit from working 4-6 hour shifts, having 15 min breaks every 4 hrs,  availability of working in sitting position, bathroom breaks,  and hydration as needed. .    If you have any questions or concerns, please don't hesitate to call.         Sincerely,        Yue Mayo MD    CC: No Recipients

## 2024-06-12 LAB — BACTERIA UR CULT: NORMAL

## 2024-06-13 ENCOUNTER — APPOINTMENT (OUTPATIENT)
Dept: MATERNAL FETAL MEDICINE | Facility: CLINIC | Age: 24
End: 2024-06-13
Payer: COMMERCIAL

## 2024-06-13 ENCOUNTER — APPOINTMENT (OUTPATIENT)
Dept: RADIOLOGY | Facility: CLINIC | Age: 24
End: 2024-06-13
Payer: COMMERCIAL

## 2024-06-20 DIAGNOSIS — O10.011 PRE-EXISTING ESSENTIAL HYPERTENSION DURING PREGNANCY IN FIRST TRIMESTER (HHS-HCC): ICD-10-CM

## 2024-06-20 RX ORDER — LABETALOL 200 MG/1
400 TABLET, FILM COATED ORAL 3 TIMES DAILY
Qty: 120 TABLET | Refills: 3 | Status: SHIPPED | OUTPATIENT
Start: 2024-06-20 | End: 2024-09-08

## 2024-06-26 DIAGNOSIS — O10.012 PRE-EXISTING ESSENTIAL HYPERTENSION DURING PREGNANCY IN SECOND TRIMESTER (HHS-HCC): Primary | ICD-10-CM

## 2024-06-26 RX ORDER — LABETALOL 200 MG/1
400 TABLET, FILM COATED ORAL 3 TIMES DAILY
Qty: 540 TABLET | Refills: 3 | Status: SHIPPED | OUTPATIENT
Start: 2024-06-26 | End: 2025-06-26

## 2024-06-30 PROBLEM — Z3A.23 23 WEEKS GESTATION OF PREGNANCY (HHS-HCC): Status: ACTIVE | Noted: 2024-02-29

## 2024-06-30 NOTE — ASSESSMENT & PLAN NOTE
- discussed 1 hr GCT, 2nd trim lab  - completion of anatomy US today 7/2  - Given metamucil for constipation.

## 2024-07-02 ENCOUNTER — HOSPITAL ENCOUNTER (OUTPATIENT)
Dept: RADIOLOGY | Facility: CLINIC | Age: 24
Discharge: HOME | End: 2024-07-02
Payer: COMMERCIAL

## 2024-07-02 ENCOUNTER — APPOINTMENT (OUTPATIENT)
Dept: MATERNAL FETAL MEDICINE | Facility: CLINIC | Age: 24
End: 2024-07-02
Payer: COMMERCIAL

## 2024-07-02 VITALS — DIASTOLIC BLOOD PRESSURE: 79 MMHG | WEIGHT: 293 LBS | BODY MASS INDEX: 61.3 KG/M2 | SYSTOLIC BLOOD PRESSURE: 124 MMHG

## 2024-07-02 DIAGNOSIS — O99.612 GASTROESOPHAGEAL REFLUX DURING PREGNANCY IN SECOND TRIMESTER, ANTEPARTUM (HHS-HCC): ICD-10-CM

## 2024-07-02 DIAGNOSIS — O10.012 PRE-EXISTING ESSENTIAL HYPERTENSION DURING PREGNANCY IN SECOND TRIMESTER (HHS-HCC): ICD-10-CM

## 2024-07-02 DIAGNOSIS — Z3A.23 23 WEEKS GESTATION OF PREGNANCY (HHS-HCC): ICD-10-CM

## 2024-07-02 DIAGNOSIS — K59.00 CONSTIPATION IN PREGNANCY IN SECOND TRIMESTER (HHS-HCC): Primary | ICD-10-CM

## 2024-07-02 DIAGNOSIS — O99.342 DEPRESSION AFFECTING PREGNANCY IN SECOND TRIMESTER, ANTEPARTUM (HHS-HCC): ICD-10-CM

## 2024-07-02 DIAGNOSIS — F32.A DEPRESSION AFFECTING PREGNANCY IN SECOND TRIMESTER, ANTEPARTUM (HHS-HCC): ICD-10-CM

## 2024-07-02 DIAGNOSIS — O26.899 CARPAL TUNNEL SYNDROME DURING PREGNANCY (HHS-HCC): ICD-10-CM

## 2024-07-02 DIAGNOSIS — O23.42 URINARY TRACT INFECTION IN MOTHER DURING SECOND TRIMESTER OF PREGNANCY (HHS-HCC): ICD-10-CM

## 2024-07-02 DIAGNOSIS — Z3A.01 LESS THAN 8 WEEKS GESTATION OF PREGNANCY (HHS-HCC): ICD-10-CM

## 2024-07-02 DIAGNOSIS — O99.612 CONSTIPATION IN PREGNANCY IN SECOND TRIMESTER (HHS-HCC): Primary | ICD-10-CM

## 2024-07-02 DIAGNOSIS — O99.212 OBESITY AFFECTING PREGNANCY IN SECOND TRIMESTER, UNSPECIFIED OBESITY TYPE (HHS-HCC): ICD-10-CM

## 2024-07-02 DIAGNOSIS — G56.00 CARPAL TUNNEL SYNDROME DURING PREGNANCY (HHS-HCC): ICD-10-CM

## 2024-07-02 DIAGNOSIS — O99.210 OBESITY AFFECTING PREGNANCY (HHS-HCC): ICD-10-CM

## 2024-07-02 DIAGNOSIS — K21.9 GASTROESOPHAGEAL REFLUX DURING PREGNANCY IN SECOND TRIMESTER, ANTEPARTUM (HHS-HCC): ICD-10-CM

## 2024-07-02 PROCEDURE — 76816 OB US FOLLOW-UP PER FETUS: CPT

## 2024-07-02 PROCEDURE — 99214 OFFICE O/P EST MOD 30 MIN: CPT | Mod: GC | Performed by: OBSTETRICS & GYNECOLOGY

## 2024-07-02 PROCEDURE — 76816 OB US FOLLOW-UP PER FETUS: CPT | Performed by: OBSTETRICS & GYNECOLOGY

## 2024-07-02 ASSESSMENT — ENCOUNTER SYMPTOMS
SHORTNESS OF BREATH: 0
HEADACHES: 0
NECK PAIN: 0
PND: 0
BLURRED VISION: 0
HYPERTENSION: 1
SWEATS: 0
PALPITATIONS: 0
ORTHOPNEA: 0

## 2024-07-02 NOTE — PROGRESS NOTES
MF Follow-up  24        SUBJECTIVE    HPI: Shravan Astudillo is a 23 y.o.  at 23w1d here for RPNV. Denies contractions, bleeding, or LOF. Reports normal fetal movement.     Patient reports that she's taking her Bps at home TID with cuff and that they are around 135 and run higher at home than at the clinic. No issues with taking her meds.     Plans to breastfeed.     Having some constipation past 1-2 wks.     Other pregnancy complications include   Patient Active Problem List   Diagnosis    Attention deficit hyperactivity disorder (ADHD)    Depression affecting pregnancy in second trimester, antepartum ([facility])    Gastroesophageal reflux during pregnancy in second trimester, antepartum ([facility])    Obesity affecting pregnancy in second trimester ([facility])    Pre-existing essential hypertension during pregnancy in second trimester ([facility])    23 weeks gestation of pregnancy ([facility])    Carpal tunnel syndrome during pregnancy ([facility])    Sciatica    Urinary tract infection in mother during second trimester of pregnancy ([facility])    Constipation in pregnancy in second trimester ([facility])       OBJECTIVE  Visit Vitals  /79 Comment: HR 89   Wt (!) 178 kg (391 lb 6.4 oz)   LMP 01/15/2024   BMI 61.30 kg/m²   OB Status Pregnant   Smoking Status Never   BSA 2.9 m²      FHT: US today    ASSESSMENT & PLAN    Shravan Astudillo is a 23 y.o.  at 23w1d here for the following concerns we addressed today:    23 weeks gestation of pregnancy ([facility])  - discussed 1 hr GCT, 2nd trim lab  - completion of anatomy US today   - Given metamucil for constipation.    Pre-existing essential hypertension during pregnancy in second trimester ([facility])  - normotensive today (at goal).  - bring BP cuff next time to check accuracy  - cont. Current dose of labetalol  - rtc in 4 wks    Constipation in pregnancy in second trimester ([facility])  - ordered stool softener (psyllium)     Orders Placed This Encounter   Procedures     CBC Anemia Panel With Reflex, Pregnancy     Standing Status:   Future     Standing Expiration Date:   7/2/2025     Order Specific Question:   Release result to MyChart     Answer:   Immediate [1]    Syphilis Screen with Reflex     Standing Status:   Future     Standing Expiration Date:   7/2/2025     Order Specific Question:   Release result to MyChart     Answer:   Immediate [1]    Glucose, 1 Hour Screen, Pregnancy     Standing Status:   Future     Standing Expiration Date:   7/2/2025     Order Specific Question:   Release result to MyChart     Answer:   Immediate [1]        RTC in 4 weeks    Patient seen and evaluated with Dr. Hu Odell MD  PGY-1, Obstetrics and Gynecology       Answers submitted by the patient for this visit:  High Blood Pressure Questionnaire (Submitted on 7/2/2024)  Chief Complaint: Hypertension  Chronicity: recurrent  Onset: more than 1 year ago  Progression since onset: unchanged  Condition status: controlled  anxiety: No  blurred vision: No  chest pain: No  headaches: No  malaise/fatigue: No  neck pain: No  orthopnea: No  palpitations: No  peripheral edema: No  PND: No  shortness of breath: No  sweats: No  Agents associated with hypertension: no associated agents  CAD risks: no known risk factors  Compliance problems: no compliance problems      I saw and evaluated the patient. I personally obtained the key and critical portions of the history and physical exam or was physically present for key and critical portions performed by the resident/fellow. I reviewed the resident/fellow's documentation and discussed the patient with the resident/fellow. I agree with the resident/fellow's medical decision making as documented in the note.    Baltazar Lui MD

## 2024-07-02 NOTE — ASSESSMENT & PLAN NOTE
- normotensive today (at goal).  - bring BP cuff next time to check accuracy  - cont. Current dose of labetalol  - rtc in 4 wks

## 2024-07-23 ENCOUNTER — LAB (OUTPATIENT)
Dept: LAB | Facility: LAB | Age: 24
End: 2024-07-23
Payer: COMMERCIAL

## 2024-07-23 DIAGNOSIS — Z3A.23 23 WEEKS GESTATION OF PREGNANCY (HHS-HCC): ICD-10-CM

## 2024-07-23 DIAGNOSIS — Z3A.26 26 WEEKS GESTATION OF PREGNANCY (HHS-HCC): Primary | ICD-10-CM

## 2024-07-23 LAB
ERYTHROCYTE [DISTWIDTH] IN BLOOD BY AUTOMATED COUNT: 14.4 % (ref 11.5–14.5)
GLUCOSE 1H P GLC SERPL-MCNC: 153 MG/DL
HCT VFR BLD AUTO: 35.5 % (ref 36–46)
HGB BLD-MCNC: 11.4 G/DL (ref 12–16)
MCH RBC QN AUTO: 26.7 PG (ref 26–34)
MCHC RBC AUTO-ENTMCNC: 32.1 G/DL (ref 32–36)
MCV RBC AUTO: 83 FL (ref 80–100)
NRBC BLD-RTO: 0 /100 WBCS (ref 0–0)
PLATELET # BLD AUTO: 275 X10*3/UL (ref 150–450)
RBC # BLD AUTO: 4.27 X10*6/UL (ref 4–5.2)
REFLEX ADDED, ANEMIA PANEL: NORMAL
TREPONEMA PALLIDUM IGG+IGM AB [PRESENCE] IN SERUM OR PLASMA BY IMMUNOASSAY: NONREACTIVE
WBC # BLD AUTO: 10.1 X10*3/UL (ref 4.4–11.3)

## 2024-07-23 PROCEDURE — 36415 COLL VENOUS BLD VENIPUNCTURE: CPT

## 2024-07-23 PROCEDURE — 86780 TREPONEMA PALLIDUM: CPT

## 2024-07-23 PROCEDURE — 85027 COMPLETE CBC AUTOMATED: CPT

## 2024-07-23 PROCEDURE — 82947 ASSAY GLUCOSE BLOOD QUANT: CPT

## 2024-07-25 PROBLEM — Z3A.27 27 WEEKS GESTATION OF PREGNANCY (HHS-HCC): Status: ACTIVE | Noted: 2024-02-29

## 2024-07-25 PROBLEM — O10.013 PRE-EXISTING ESSENTIAL HYPERTENSION DURING PREGNANCY IN THIRD TRIMESTER (HHS-HCC): Status: ACTIVE | Noted: 2024-02-29

## 2024-07-29 ENCOUNTER — LAB (OUTPATIENT)
Dept: LAB | Facility: LAB | Age: 24
End: 2024-07-29
Payer: COMMERCIAL

## 2024-07-29 DIAGNOSIS — Z3A.26 26 WEEKS GESTATION OF PREGNANCY (HHS-HCC): ICD-10-CM

## 2024-07-29 LAB
GLUCOSE 1H P GLC SERPL-MCNC: 199 MG/DL
GLUCOSE 2H P GLC SERPL-MCNC: 115 MG/DL
GLUCOSE 3H P GLC SERPL-MCNC: 84 MG/DL
GLUCOSE P FAST SERPL-MCNC: 99 MG/DL

## 2024-07-29 PROCEDURE — 82952 GTT-ADDED SAMPLES: CPT

## 2024-07-29 PROCEDURE — 82950 GLUCOSE TEST: CPT

## 2024-07-29 PROCEDURE — 82951 GLUCOSE TOLERANCE TEST (GTT): CPT

## 2024-07-29 PROCEDURE — 82947 ASSAY GLUCOSE BLOOD QUANT: CPT

## 2024-07-29 PROCEDURE — 36415 COLL VENOUS BLD VENIPUNCTURE: CPT

## 2024-07-30 ENCOUNTER — ROUTINE PRENATAL (OUTPATIENT)
Dept: MATERNAL FETAL MEDICINE | Facility: CLINIC | Age: 24
End: 2024-07-30
Payer: COMMERCIAL

## 2024-07-30 VITALS
WEIGHT: 293 LBS | DIASTOLIC BLOOD PRESSURE: 77 MMHG | SYSTOLIC BLOOD PRESSURE: 125 MMHG | BODY MASS INDEX: 62.57 KG/M2 | HEART RATE: 87 BPM

## 2024-07-30 DIAGNOSIS — O10.013 PRE-EXISTING ESSENTIAL HYPERTENSION DURING PREGNANCY IN THIRD TRIMESTER (HHS-HCC): ICD-10-CM

## 2024-07-30 DIAGNOSIS — Z3A.27 27 WEEKS GESTATION OF PREGNANCY (HHS-HCC): Primary | ICD-10-CM

## 2024-07-30 DIAGNOSIS — O26.899 CARPAL TUNNEL SYNDROME DURING PREGNANCY (HHS-HCC): ICD-10-CM

## 2024-07-30 DIAGNOSIS — F32.A DEPRESSION AFFECTING PREGNANCY IN SECOND TRIMESTER, ANTEPARTUM (HHS-HCC): ICD-10-CM

## 2024-07-30 DIAGNOSIS — O24.410 DIET CONTROLLED GESTATIONAL DIABETES MELLITUS (GDM) IN SECOND TRIMESTER (HHS-HCC): ICD-10-CM

## 2024-07-30 DIAGNOSIS — O99.342 DEPRESSION AFFECTING PREGNANCY IN SECOND TRIMESTER, ANTEPARTUM (HHS-HCC): ICD-10-CM

## 2024-07-30 DIAGNOSIS — Z23 NEED FOR TDAP VACCINATION: ICD-10-CM

## 2024-07-30 DIAGNOSIS — G56.00 CARPAL TUNNEL SYNDROME DURING PREGNANCY (HHS-HCC): ICD-10-CM

## 2024-07-30 DIAGNOSIS — Z71.85 VACCINE COUNSELING: ICD-10-CM

## 2024-07-30 PROBLEM — G47.9 DIFFICULTY SLEEPING: Status: ACTIVE | Noted: 2024-07-30

## 2024-07-30 PROBLEM — O24.419 GESTATIONAL DIABETES MELLITUS (GDM) IN SECOND TRIMESTER (HHS-HCC): Status: ACTIVE | Noted: 2024-07-30

## 2024-07-30 PROCEDURE — 90715 TDAP VACCINE 7 YRS/> IM: CPT | Performed by: OBSTETRICS & GYNECOLOGY

## 2024-07-30 PROCEDURE — 99214 OFFICE O/P EST MOD 30 MIN: CPT | Mod: GC | Performed by: OBSTETRICS & GYNECOLOGY

## 2024-07-30 RX ORDER — BLOOD SUGAR DIAGNOSTIC
STRIP MISCELLANEOUS
Qty: 150 EACH | Refills: 3 | Status: SHIPPED | OUTPATIENT
Start: 2024-07-30

## 2024-07-30 RX ORDER — POLYETHYLENE GLYCOL 3350 17 G/17G
17 POWDER, FOR SOLUTION ORAL DAILY
Qty: 30 PACKET | Refills: 0 | Status: SHIPPED | OUTPATIENT
Start: 2024-07-30 | End: 2024-08-29

## 2024-07-30 RX ORDER — LANCETS
EACH MISCELLANEOUS
Qty: 200 EACH | Refills: 3 | Status: SHIPPED | OUTPATIENT
Start: 2024-07-30

## 2024-07-30 RX ORDER — ISOPROPYL ALCOHOL 70 ML/100ML
SWAB TOPICAL
Qty: 200 EACH | Refills: 3 | Status: SHIPPED | OUTPATIENT
Start: 2024-07-30

## 2024-07-30 RX ORDER — INSULIN PUMP SYRINGE, 3 ML
EACH MISCELLANEOUS
Qty: 1 EACH | Refills: 0 | Status: SHIPPED | OUTPATIENT
Start: 2024-07-30

## 2024-07-30 NOTE — ASSESSMENT & PLAN NOTE
Normotensive in office today  Stable on current regimen  Continue labetalol 400 TID  Recommend continued at-home BP monitoring

## 2024-07-30 NOTE — PROGRESS NOTES
MFM Follow-up  24  Shravan Astudillo      SUBJECTIVE    HPI: Shravan Astudillo is a 23 y.o.  at 27w1d here for RPNV and newly diagnosed GDM. Patient states she completed her 3-hour GTT yesterday. She has no complaints today other than trouble sleeping and constipation. Reports good fetal movement. Denies loss of fluid, bleeding, or contractions.     Other pregnancy complications include:  1) cHTN:  - Patient reports measuring blood pressures at home 2x daily with numbers being in the 120s-130s/60-80s  - Reports taking labetalol 400 TID  - Asymptomatic from PreE perspective (denies headache, change in vision, abdominal pain)  2) GERD:  - Taking pantoprazole daily  3) Obesity:  - Prepregnancy BMI 59.38  - BMI 62.57 today     Patient Active Problem List   Diagnosis    Attention deficit hyperactivity disorder (ADHD)    Depression affecting pregnancy in second trimester, antepartum ([facility])    Gastroesophageal reflux during pregnancy in second trimester, antepartum ([facility])    Obesity affecting pregnancy in second trimester ([facility])    Pre-existing essential hypertension during pregnancy in third trimester ([facility])    27 weeks gestation of pregnancy ([facility])    Carpal tunnel syndrome during pregnancy ([facility])    Sciatica    Urinary tract infection in mother during second trimester of pregnancy ([facility])    Constipation in pregnancy in second trimester ([facility])    Gestational diabetes mellitus (GDM) in second trimester ([facility])    Difficulty sleeping       OBJECTIVE  Visit Vitals  /77   Pulse 87   Wt (!) 181 kg (399 lb 8 oz)   LMP 01/15/2024   BMI 62.57 kg/m²   OB Status Pregnant   Smoking Status Never   BSA 2.93 m²      General: No acute distress, normal mood  CV/Pulm: Warm and well perfused, no increased work of breathing  Abdomen: Gravid, soft, nontender  Neuro: No focal deficits  FHT: 147    ASSESSMENT & PLAN    Shravan Astudillo is a 23 y.o.  at 27w1d here for the following concerns we  addressed today:    Pre-existing essential hypertension during pregnancy in third trimester ([facility])  Normotensive in office today  Stable on current regimen  Continue labetalol 400 TID  Recommend continued at-home BP monitoring    Gastroesophageal reflux during pregnancy in second trimester, antepartum ([facility])  Stable on current regimen  Continue daily pantoprazole     Constipation in pregnancy in second trimester ([facility])  Recommend daily miralax for stool softening     Difficulty sleeping  Recommended unisom 25mg     Gestational diabetes mellitus (GDM) in second trimester ([facility])  Patient was recently diagnosed with gestational diabetes (GDM).  We discussed the pregnancy implications of the diagnosis including increased risk of pre-eclampsia, LGA/macrosomia, shoulder dystocia, stillbirth as well as  hypoglycemia, hyperbilirubinemia and respiratory distress.  We reviewed the importance of glycemic control and its impact on lowering these risks.    Discussed management ranging from dietary changes to pharmacotherapy and that insulin is considered first line therapy rather than oral agents if medication is ultimately needed.    Discussed our recommendation for serial growth ultrasounds and starting twice weekly  testing if medication treatment is needed.  We also stressed the potential persistence of diabetes post pregnancy and recommendations for screening post-partum and, if normal, routine screening every 3 years. We did discuss that a healthy diet and maintenance of a normal body weight may reduce those risks.  Current treatment plan: no medical treatment at this time as she was just diagnosed yesterday . Discussed with patient that she should start recording blood sugars in the morning after fasting for 8 hours as well as 1 hour after meals. Patient will send in values via blood sugar line. Will discuss possible need for insulin if indicated.   In summary the following is  recommended:  We will manage diabetes via the Bloodsugar line with weekly assessment of glycemic control  Recommend serial growth ultrasounds every 4 weeks   testing: starting at 32 weeks if requires insulin  Delivery is recommended at 39 weeks, though if glycemic control is suboptimal then delivery at 37-39 weeks may be considered.  If the EFW is >4500g at the time of delivery  should be considered.  A 2hr GTT is recommended 6 weeks postpartum (can be performed as soon as 2 days postpartum), if normal then screening for T2DM is recommended at least every 3 years.  Will start with diet control and glucose monitoring at home. Diabetes education performed today. Referral to nutrition made today.      27 weeks gestation of pregnancy ([facility])  Tdap given today     Orders Placed This Encounter   Procedures    Tdap vaccine, age 7 years and older  ([employer])      RTC in 3 weeks.    Patient seen and evaluated with Dr. Lui.    KOSTAS LOTT , MS-4  Medical Student  Maternal Fetal Medicine Team     I saw and evaluated the patient. I personally obtained the key and critical portions of the history and physical exam or was physically present for key and critical portions performed by the resident/fellow. I reviewed the resident/fellow's documentation and discussed the patient with the resident/fellow. I agree with the resident/fellow's medical decision making as documented in the note.    Counseled on implications of new diagnosis of GDM and plan for close interval follow up to assess glycemic control.    Baltazar Lui MD

## 2024-07-30 NOTE — ASSESSMENT & PLAN NOTE
Patient was recently diagnosed with gestational diabetes (GDM).  We discussed the pregnancy implications of the diagnosis including increased risk of pre-eclampsia, LGA/macrosomia, shoulder dystocia, stillbirth as well as  hypoglycemia, hyperbilirubinemia and respiratory distress.  We reviewed the importance of glycemic control and its impact on lowering these risks.    Discussed management ranging from dietary changes to pharmacotherapy and that insulin is considered first line therapy rather than oral agents if medication is ultimately needed.    Discussed our recommendation for serial growth ultrasounds and starting twice weekly  testing if medication treatment is needed.  We also stressed the potential persistence of diabetes post pregnancy and recommendations for screening post-partum and, if normal, routine screening every 3 years. We did discuss that a healthy diet and maintenance of a normal body weight may reduce those risks.  Current treatment plan: no medical treatment at this time as she was just diagnosed yesterday . Discussed with patient that she should start recording blood sugars in the morning after fasting for 8 hours as well as 1 hour after meals. Patient will send in values via blood sugar line. Will discuss possible need for insulin if indicated.   In summary the following is recommended:  We will manage diabetes via the Bloodsugar line with weekly assessment of glycemic control  Recommend serial growth ultrasounds every 4 weeks   testing: starting at 32 weeks if requires insulin  Delivery is recommended at 39 weeks, though if glycemic control is suboptimal then delivery at 37-39 weeks may be considered.  If the EFW is >4500g at the time of delivery  should be considered.  A 2hr GTT is recommended 6 weeks postpartum (can be performed as soon as 2 days postpartum), if normal then screening for T2DM is recommended at least every 3 years.  Will start with diet  control and glucose monitoring at home. Diabetes education performed today. Referral to nutrition made today.

## 2024-07-31 ENCOUNTER — TELEPHONE (OUTPATIENT)
Dept: MATERNAL FETAL MEDICINE | Facility: CLINIC | Age: 24
End: 2024-07-31
Payer: COMMERCIAL

## 2024-07-31 NOTE — TELEPHONE ENCOUNTER
Called to schedule GDM Nutrition consult. Left voicemail message with office contact information: [phone].

## 2024-08-04 ENCOUNTER — PATIENT MESSAGE (OUTPATIENT)
Dept: MATERNAL FETAL MEDICINE | Facility: CLINIC | Age: 24
End: 2024-08-04
Payer: COMMERCIAL

## 2024-08-04 DIAGNOSIS — O24.415 GESTATIONAL DIABETES MELLITUS (GDM) IN SECOND TRIMESTER CONTROLLED ON ORAL HYPOGLYCEMIC DRUG (HHS-HCC): ICD-10-CM

## 2024-08-05 ENCOUNTER — HOSPITAL ENCOUNTER (OUTPATIENT)
Dept: RADIOLOGY | Facility: CLINIC | Age: 24
Discharge: HOME | End: 2024-08-05
Payer: COMMERCIAL

## 2024-08-05 ENCOUNTER — NUTRITION (OUTPATIENT)
Dept: OBSTETRICS AND GYNECOLOGY | Facility: CLINIC | Age: 24
End: 2024-08-05
Payer: COMMERCIAL

## 2024-08-05 DIAGNOSIS — Z3A.17 17 WEEKS GESTATION OF PREGNANCY (HHS-HCC): ICD-10-CM

## 2024-08-05 DIAGNOSIS — O10.011 PRE-EXISTING ESSENTIAL HYPERTENSION DURING PREGNANCY IN FIRST TRIMESTER (HHS-HCC): ICD-10-CM

## 2024-08-05 PROCEDURE — 76816 OB US FOLLOW-UP PER FETUS: CPT | Performed by: OBSTETRICS & GYNECOLOGY

## 2024-08-05 PROCEDURE — 76819 FETAL BIOPHYS PROFIL W/O NST: CPT

## 2024-08-05 PROCEDURE — 76816 OB US FOLLOW-UP PER FETUS: CPT

## 2024-08-05 PROCEDURE — 76819 FETAL BIOPHYS PROFIL W/O NST: CPT | Performed by: OBSTETRICS & GYNECOLOGY

## 2024-08-05 RX ORDER — METFORMIN HYDROCHLORIDE 500 MG/1
TABLET ORAL
Qty: 60 TABLET | Refills: 3 | Status: SHIPPED | OUTPATIENT
Start: 2024-08-05

## 2024-08-05 NOTE — PATIENT COMMUNICATION
As per discussion with patient, she has persistently elevated fasting sugars. Does not have bedtime snack, does not care to eat snacks, or eat that late. Meeting with Blanca for nutrition consult later this afternoon.  Was counseled at [facility] consult about medication use, with preference towards insulin vs oral hypoglycemic agents.  Pt has strong preference to avoid insulin if possible, and would like to try Metformin first. She states understanding that she may ultimately need insulin to reach healthy range blood sugars.

## 2024-08-05 NOTE — PROGRESS NOTES
Nutrition Assessment     Reason for Visit:  Shravan Astudillo is a 23 y.o. female who presents for Gestational Diabetes    Food And Nutrient Intake:  Food and Nutrient History  Food and Nutrient History: Telephone Nutrition consult today. Per pt:   FB, 105, 111, 104. No HS snack currently.  Pc: infrequent elevations r/t cold cereal and milk, sandwich w/chips       Nutrition Diagnosis      Nutrition Diagnosis  Patient has Nutrition Diagnosis: Yes  Nutrition Diagnosis 1: Food and nutrition related knowledge deficit  Related to (1): lack of prior/recent nutrition counseling r/t BG control in pregnancy  As Evidenced by (1): intial nutrition consult    Nutrition Interventions/Recommendations   Food and Nutrition Delivery  Meals & Snacks: Carbohydrate-modified diet  Goals: Recommended Daily carbohydrate distribution:    Breakfast: 1-2 carbohydrate servings (15-30g CHO) --  avoid concentrated sweets/high GI foods: fruit, fruit juices, cold cereal and milk, syrup, jams and jellies, etc.  AM Snack:  1-2 servings   Lunch:  3-4 servings (45-60g CHO)  Midday Snack:  1-2 servings   Dinner: 3-4 servings   HS Snack:  2 servings (30g)    -Be sure to add protein food to carb choices at each meal and snack: cheese, meat, eggs, nuts, peanut butter, etc    -During the day, eat about every 2 - 4 hours.     -Avoid more than 8-10 hours overnight without eating. If more than about 2hrs between dinner and bedtime, have a carb plus protein snack.     -Higher fiber carbohydrates/whole grains are preferred over refined carb choices.    -- Avoid juices and sugar-sweetened beverages. Diet drinks are fine. Try eliminating milk at mealtime if noticing after-meal BG elevations. Unsweetened almond or soymilk beverages are a lower-carb dairy alternative.    -Recommended Dietary Allowance for carbohydrates during pregnancy is a minimum of 175g/day (11-12 servings) to provide 33g/day for fetal brain development. Many women do well eating a bit  less than 175g CHO/day--this is fine, as long as there is no purposeful, severe restriction of carb foods at meals and snacks (ex: Keto or Atkins-style diets for BG control) and no symptoms indicating inadequate carb intake-- loss of weight, feeling dizzy/lightheaded, irritable, confused, excessively hungry, etc.    --Pt to try HS snack. Examples discussed.    Pt Education Materials emailed: Diet for GDM; Sample Meals and Snacks; Yogurt Options    Nutrition Monitoring and Evaluation   Biochemical Data, Medical Tests and Procedures  Monitoring and Evaluation Plan: Glucose/endocrine profile  Glucose/Endocrine Profile: Glucose, fasting, Other (Comment)  Criteria: Fasting BG 95 or less; 1hr post-meal  or less. Blood sugars will be submitted by pt to [facility] Line for review weekly. Follow-up as needed to assess goal attainment. Modifications based on further assessment and self-blood glucose monitoring results. Pt expresses understanding and agreement.          Time Spent  Prep time on day of patient encounter: 0 minutes  Time spent directly with patient, family or caregiver: 37 minutes  Additional Time Spent on Patient Care Activities: 3 minutes  Documentation Time: 5 minutes  Other Time Spent: 0 minutes  Total: 45 minutes

## 2024-08-15 ENCOUNTER — TELEPHONE (OUTPATIENT)
Dept: MATERNAL FETAL MEDICINE | Facility: CLINIC | Age: 24
End: 2024-08-15
Payer: COMMERCIAL

## 2024-08-15 NOTE — TELEPHONE ENCOUNTER
Blood Sugar Support Line Communication   Communicated with the patient on 8/15/2024   She has Gestational Diabetes 29w3d    At the time of the call her diabetes was treated with:  Metformin 1000mg p.o began  increased dose 8/24     The patient checks her sugars and reports them as follows:  August 5th:  Fasting Blood Sugar: 104  Breakfast: Strawberry Oikos yogurt with 1 cup Blueberries and 1 cup strawberries  BP: 132/76  After Breakfast Sugar: 113  Lunch: frozen burritos  After Lunch Blood Sugar: 141  Snack: 20 cheese eleanor   Dinner: frozen burritos   After Dinner Blood Sugar: 132     August 6th:  Fasting Blood Sugar: 108  Breakfast: missed  Lunch: mcdonalds  After Lunch Blood Sugar: Missed   Snack: none  Dinner: taco salad   After Dinner Blood Sugar: 113  Metformin started nightly with dinner (500)    August 7th:  Fasting Blood Sugar: 115  Breakfast: omelet, string cheese, fruit  Forgot to test Sugar   Lunch: left over taco bowl  After Lunch Blood Sugar: 126  Snack: fruit  Dinner: [employer] and chips   After Dinner Blood Sugar: 122 (500 metformin)    August 8th:  Fasting Blood Sugar: 90  After Lunch Blood Sugar: 128  After Dinner Blood Sugar: 135  (1000 metformin)    August 9th:  Fasting Blood Sugar: 92  Breakfast: eggs, fruit, string cheese  After Breakfast Sugar: 92  After Lunch Blood Sugar: 92  Dinner: taco salad   After Dinner Blood Sugar: unk  (1000 metformin)    August 10th:  Fasting Blood Sugar: 101  Breakfast: eggs cheese fruit  BP: 134/74  After breakfast sugar: 121  Lunch: mission bbq  After Lunch Blood Sugar: unk  BP: 120/56  Dinner: arbys  After Dinner Blood Sugar: 124 (1000 metformin)    August 11th:  Fasting Blood Sugar: 96  Breakfast: Missed breakfast   Breakfast Sugar: x  Lunch:  Missed Lunch  After Lunch Blood Sugar: x  Snack: oreos :(  Dinner: panda express  After Dinner Blood Sugar: 162   (1000 metformin)    August 12th:  Fasting Blood Sugar:  89  After Dinner Blood Sugar: 148  (1000  metfromin)   (Missed some testings + some missed meals)   Secretary oreos spike Sugar too much, tried Sugar free kind in replacement ??   Snacking when possible (trail mix, yogurt, fruits, or cheese sticks)     Should I be testing after each Snack as well or just after meal times?   Metformin seems to be helping morning Sugars but some Fasting ones seem high still.       The patient's regimen was changed to:   Tend improving  toward goal BG.  No change continue Metformin 1000mg p.o. .evening    Patient understands to submit sugar log for review weekly through the Blood Sugar Line @ [phone] or via email to [email or web address] to help optimize glucose control.    A voice mail message was left at the contact number provided by the patient.

## 2024-08-18 PROBLEM — Z3A.30 30 WEEKS GESTATION OF PREGNANCY (HHS-HCC): Status: ACTIVE | Noted: 2024-02-29

## 2024-08-19 PROBLEM — O99.213 OBESITY AFFECTING PREGNANCY IN THIRD TRIMESTER (HHS-HCC): Status: ACTIVE | Noted: 2024-02-29

## 2024-08-19 PROBLEM — O99.343 DEPRESSION AFFECTING PREGNANCY IN THIRD TRIMESTER, ANTEPARTUM (HHS-HCC): Status: ACTIVE | Noted: 2023-09-17

## 2024-08-20 ENCOUNTER — ROUTINE PRENATAL (OUTPATIENT)
Dept: MATERNAL FETAL MEDICINE | Facility: CLINIC | Age: 24
End: 2024-08-20
Payer: COMMERCIAL

## 2024-08-20 ENCOUNTER — APPOINTMENT (OUTPATIENT)
Dept: MATERNAL FETAL MEDICINE | Facility: CLINIC | Age: 24
End: 2024-08-20
Payer: COMMERCIAL

## 2024-08-20 VITALS — DIASTOLIC BLOOD PRESSURE: 84 MMHG | SYSTOLIC BLOOD PRESSURE: 136 MMHG | BODY MASS INDEX: 62.18 KG/M2 | WEIGHT: 293 LBS

## 2024-08-20 DIAGNOSIS — G56.00 CARPAL TUNNEL SYNDROME DURING PREGNANCY (HHS-HCC): ICD-10-CM

## 2024-08-20 DIAGNOSIS — O99.612 GASTROESOPHAGEAL REFLUX DURING PREGNANCY IN SECOND TRIMESTER, ANTEPARTUM (HHS-HCC): ICD-10-CM

## 2024-08-20 DIAGNOSIS — K59.00 CONSTIPATION IN PREGNANCY IN SECOND TRIMESTER (HHS-HCC): ICD-10-CM

## 2024-08-20 DIAGNOSIS — K21.9 GASTROESOPHAGEAL REFLUX DURING PREGNANCY IN SECOND TRIMESTER, ANTEPARTUM (HHS-HCC): ICD-10-CM

## 2024-08-20 DIAGNOSIS — O23.42 URINARY TRACT INFECTION IN MOTHER DURING SECOND TRIMESTER OF PREGNANCY (HHS-HCC): ICD-10-CM

## 2024-08-20 DIAGNOSIS — O99.343 DEPRESSION AFFECTING PREGNANCY IN THIRD TRIMESTER, ANTEPARTUM (HHS-HCC): ICD-10-CM

## 2024-08-20 DIAGNOSIS — Z3A.30 30 WEEKS GESTATION OF PREGNANCY (HHS-HCC): Primary | ICD-10-CM

## 2024-08-20 DIAGNOSIS — O24.415 GESTATIONAL DIABETES MELLITUS (GDM) IN THIRD TRIMESTER CONTROLLED ON ORAL HYPOGLYCEMIC DRUG (HHS-HCC): ICD-10-CM

## 2024-08-20 DIAGNOSIS — O10.013 PRE-EXISTING ESSENTIAL HYPERTENSION DURING PREGNANCY IN THIRD TRIMESTER (HHS-HCC): ICD-10-CM

## 2024-08-20 DIAGNOSIS — O99.612 CONSTIPATION IN PREGNANCY IN SECOND TRIMESTER (HHS-HCC): ICD-10-CM

## 2024-08-20 DIAGNOSIS — G47.9 DIFFICULTY SLEEPING: ICD-10-CM

## 2024-08-20 DIAGNOSIS — F32.A DEPRESSION AFFECTING PREGNANCY IN THIRD TRIMESTER, ANTEPARTUM (HHS-HCC): ICD-10-CM

## 2024-08-20 DIAGNOSIS — O26.899 CARPAL TUNNEL SYNDROME DURING PREGNANCY (HHS-HCC): ICD-10-CM

## 2024-08-20 LAB — GLUCOSE BLD MANUAL STRIP-MCNC: 98 MG/DL (ref 74–99)

## 2024-08-20 PROCEDURE — 82947 ASSAY GLUCOSE BLOOD QUANT: CPT | Performed by: OBSTETRICS & GYNECOLOGY

## 2024-08-20 PROCEDURE — 99214 OFFICE O/P EST MOD 30 MIN: CPT | Mod: GC | Performed by: OBSTETRICS & GYNECOLOGY

## 2024-08-20 NOTE — PROGRESS NOTES
MFM Follow-up  2024        SUBJECTIVE    HPI: Shravan Astudillo is a 23 y.o.  at 30w1d here for RPNV. Denies contractions, bleeding, or LOF. Reports normal fetal movement.   Bps normotensive. Intermittent headaches, resolve with tylenol.     GDM - taking metformin 1000 at bedtime    Fasting -  (5/7 high)  Breakfast -  (1/5 high)   Lunch - 113  Dinn - within goal     Other pregnancy complications include    Patient Active Problem List   Diagnosis    Attention deficit hyperactivity disorder (ADHD)    Depression affecting pregnancy in third trimester, antepartum ([facility])    Gastroesophageal reflux during pregnancy in second trimester, antepartum ([facility])    Obesity affecting pregnancy in third trimester ([facility])    Pre-existing essential hypertension during pregnancy in third trimester ([facility])    30 weeks gestation of pregnancy ([facility])    Carpal tunnel syndrome during pregnancy ([facility])    Sciatica    Urinary tract infection in mother during second trimester of pregnancy ([facility])    Constipation in pregnancy in second trimester ([facility])    Gestational diabetes mellitus (GDM) in third trimester ([facility])    Difficulty sleeping       OBJECTIVE  Visit Vitals  /84 Comment: HR 87   Wt (!) 180 kg (397 lb)   LMP 01/15/2024   BMI 62.18 kg/m²   OB Status Pregnant   Smoking Status Never   BSA 2.92 m²      FHT: 140s (US)    ASSESSMENT & PLAN    Shravan Astudillo is a 23 y.o.  at 30w1d here for the following concerns we addressed today:    Depression affecting pregnancy in third trimester, antepartum ([facility])  - mood stable    Pre-existing essential hypertension during pregnancy in third trimester ([facility])  - cont labetalol 400 TID  - normotensive in office  - asymptomatic, reviewed PEC symptoms to monitor for    Gestational diabetes mellitus (GDM) in third trimester ([facility])  - BG logs reviewed, elevated fastings but postprandials within goal  - pt expressed preference to trial  metformin before insulin. Will increase to metformin 500 in AM, 1000 qhs  - We discussed that glyburide and metformin are both well studied in pregnancy, however ACOG and the ADA recommend use of insulin as first line for the treatment of diabetes.  We reviewed that there is a small amount of transplacental passage of glyburide and that it is associated with a higher risk of  hypoglycemia and possibly LGA/macrosomia as compared to insulin; long term follow up data is lacking.  Regarding metformin, we reviewed that it does appear to be an effective medication, though supplemental insulin is frequently still required.  However, metformin has very significant transplacental passage (fetal concentration may be as high as 200% of the maternal concentration) and that follow up data at 2 and 9 years of age suggests exposed children have a higher BMI/growth trajectories with concern that this may translate to higher rates of cardiometabolic disease as adults.  We reviewed that our practice mirrors the recommendations of the ADA and ACOG, however in select circumstances oral hypoglycemics may be appropriate in which case metformin is the preferred agent as, compared to glyburide, it is the more effective hypoglycemic agent. We reviewed that if her preference is to avoid insulin, we can consider adding glyburide if metformin titration does not improve her sugars further.       Orders Placed This Encounter   Procedures    POCT GLUCOSE     Order Specific Question:   Release result to [employer]     Answer:   Immediate        RTC in 2 weeks    Patient seen and evaluated with Dr. Hu Jenkins MD

## 2024-08-20 NOTE — ASSESSMENT & PLAN NOTE
- cont labetalol 400 TID  - normotensive in office  - asymptomatic, reviewed PEC symptoms to monitor for

## 2024-08-20 NOTE — ASSESSMENT & PLAN NOTE
- BG logs reviewed, elevated fastings but postprandials within goal  - pt expressed preference to trial metformin before insulin. Will increase to metformin 500 in AM, 1000 qhs  - We discussed that glyburide and metformin are both well studied in pregnancy, however ACOG and the ADA recommend use of insulin as first line for the treatment of diabetes.  We reviewed that there is a small amount of transplacental passage of glyburide and that it is associated with a higher risk of  hypoglycemia and possibly LGA/macrosomia as compared to insulin; long term follow up data is lacking.  Regarding metformin, we reviewed that it does appear to be an effective medication, though supplemental insulin is frequently still required.  However, metformin has very significant transplacental passage (fetal concentration may be as high as 200% of the maternal concentration) and that follow up data at 2 and 9 years of age suggests exposed children have a higher BMI/growth trajectories with concern that this may translate to higher rates of cardiometabolic disease as adults.  We reviewed that our practice mirrors the recommendations of the ADA and ACOG, however in select circumstances oral hypoglycemics may be appropriate in which case metformin is the preferred agent as, compared to glyburide, it is the more effective hypoglycemic agent. We reviewed that if her preference is to avoid insulin, we can consider adding glyburide if metformin titration does not improve her sugars further.

## 2024-08-26 ENCOUNTER — TELEPHONE (OUTPATIENT)
Dept: MATERNAL FETAL MEDICINE | Facility: CLINIC | Age: 24
End: 2024-08-26
Payer: COMMERCIAL

## 2024-08-26 DIAGNOSIS — O24.414 INSULIN CONTROLLED GESTATIONAL DIABETES MELLITUS (GDM) IN THIRD TRIMESTER (HHS-HCC): ICD-10-CM

## 2024-08-26 RX ORDER — ISOPROPYL ALCOHOL 70 ML/100ML
SWAB TOPICAL
Qty: 100 EACH | Refills: 3 | Status: SHIPPED | OUTPATIENT
Start: 2024-08-26

## 2024-08-26 RX ORDER — HUMAN INSULIN 100 [IU]/ML
INJECTION, SUSPENSION SUBCUTANEOUS
Qty: 15 ML | Refills: 3 | Status: SHIPPED | OUTPATIENT
Start: 2024-08-26

## 2024-08-26 RX ORDER — PEN NEEDLE, DIABETIC 30 GX3/16"
NEEDLE, DISPOSABLE MISCELLANEOUS
Qty: 100 EACH | Refills: 3 | Status: SHIPPED | OUTPATIENT
Start: 2024-08-26

## 2024-08-26 NOTE — TELEPHONE ENCOUNTER
Communicated with the patient on 8/26/2024  She has Gestational Diabetes @ 31w0d     The patient checks her sugars fasting and 1 hour after meals, and reports them as follows:   8/22 102 - - 117  8/23 97 - - 104  8/24 88 119 - 133  8/25 98    Impression:  Still having some mildly elevated fasting sugars. Pt prefers to switch to insulin at this time after conversation at last appointment with MFM. Will continue Metformin until Rx and education completed for NPH insulin.  Insulin ed scheduled for 8/28 via secure ZOOM.    The patient's regimen was changed, as discussed with JAMES Thompson, APRN,to:   Met 500/1000 am/pm --> NPH 10* at bedtime    Patient understands to submit sugar log for review weekly through the Blood Sugar Line @ [phone] or via email to [email or web address] to help optimize glucose control.    Approximately 8 minutes was spent discussing the above information.

## 2024-08-28 ENCOUNTER — EDUCATION (OUTPATIENT)
Dept: MATERNAL FETAL MEDICINE | Facility: CLINIC | Age: 24
End: 2024-08-28
Payer: COMMERCIAL

## 2024-08-28 NOTE — NURSING NOTE
Insulin Pen Education provided 8/28/2024 via ZOOM    Able to demonstrate/describe:     Wash hands      Check label - verify correct medication   Gentle mix (NPH)   Attach new needle each time   Safety test (prime pen, ensure needle working properly)    Select correct dose   Self injection   Site selection & rotation   Remove needle   Insulin Storage   Needle disposal    Review Hypoglycemia    Causes   Sign & symptoms   Oral Treatment    Patient appears to have good understanding and is engaged in self-care.  Encouraged to call for questions or concerns    Given printed education materials  Plans f/u by phone 2-3 days and then weekly for BGM support.    Given log sheets and contact information.

## 2024-09-03 ENCOUNTER — APPOINTMENT (OUTPATIENT)
Dept: RADIOLOGY | Facility: CLINIC | Age: 24
End: 2024-09-03
Payer: COMMERCIAL

## 2024-09-03 ENCOUNTER — HOSPITAL ENCOUNTER (OUTPATIENT)
Dept: RADIOLOGY | Facility: CLINIC | Age: 24
Discharge: HOME | End: 2024-09-03
Payer: COMMERCIAL

## 2024-09-03 ENCOUNTER — ROUTINE PRENATAL (OUTPATIENT)
Dept: MATERNAL FETAL MEDICINE | Facility: CLINIC | Age: 24
End: 2024-09-03
Payer: COMMERCIAL

## 2024-09-03 VITALS
DIASTOLIC BLOOD PRESSURE: 80 MMHG | BODY MASS INDEX: 61.13 KG/M2 | SYSTOLIC BLOOD PRESSURE: 129 MMHG | HEART RATE: 80 BPM | WEIGHT: 293 LBS

## 2024-09-03 DIAGNOSIS — O99.612 GASTROESOPHAGEAL REFLUX DURING PREGNANCY IN SECOND TRIMESTER, ANTEPARTUM (HHS-HCC): ICD-10-CM

## 2024-09-03 DIAGNOSIS — Z3A.17 17 WEEKS GESTATION OF PREGNANCY (HHS-HCC): ICD-10-CM

## 2024-09-03 DIAGNOSIS — K59.00 CONSTIPATION IN PREGNANCY IN SECOND TRIMESTER (HHS-HCC): ICD-10-CM

## 2024-09-03 DIAGNOSIS — O10.011 PRE-EXISTING ESSENTIAL HYPERTENSION DURING PREGNANCY IN FIRST TRIMESTER (HHS-HCC): ICD-10-CM

## 2024-09-03 DIAGNOSIS — G56.00 CARPAL TUNNEL SYNDROME DURING PREGNANCY (HHS-HCC): ICD-10-CM

## 2024-09-03 DIAGNOSIS — O99.612 CONSTIPATION IN PREGNANCY IN SECOND TRIMESTER (HHS-HCC): ICD-10-CM

## 2024-09-03 DIAGNOSIS — O10.013 PRE-EXISTING ESSENTIAL HYPERTENSION DURING PREGNANCY IN THIRD TRIMESTER (HHS-HCC): ICD-10-CM

## 2024-09-03 DIAGNOSIS — K21.9 GASTROESOPHAGEAL REFLUX DURING PREGNANCY IN SECOND TRIMESTER, ANTEPARTUM (HHS-HCC): ICD-10-CM

## 2024-09-03 DIAGNOSIS — G47.9 DIFFICULTY SLEEPING: ICD-10-CM

## 2024-09-03 DIAGNOSIS — Z3A.30 30 WEEKS GESTATION OF PREGNANCY (HHS-HCC): ICD-10-CM

## 2024-09-03 DIAGNOSIS — O23.42 URINARY TRACT INFECTION IN MOTHER DURING SECOND TRIMESTER OF PREGNANCY (HHS-HCC): ICD-10-CM

## 2024-09-03 DIAGNOSIS — O99.343 DEPRESSION AFFECTING PREGNANCY IN THIRD TRIMESTER, ANTEPARTUM (HHS-HCC): ICD-10-CM

## 2024-09-03 DIAGNOSIS — O26.899 CARPAL TUNNEL SYNDROME DURING PREGNANCY (HHS-HCC): ICD-10-CM

## 2024-09-03 DIAGNOSIS — Z3A.32 32 WEEKS GESTATION OF PREGNANCY (HHS-HCC): Primary | ICD-10-CM

## 2024-09-03 DIAGNOSIS — F32.A DEPRESSION AFFECTING PREGNANCY IN THIRD TRIMESTER, ANTEPARTUM (HHS-HCC): ICD-10-CM

## 2024-09-03 LAB — GLUCOSE BLD MANUAL STRIP-MCNC: 89 MG/DL (ref 74–99)

## 2024-09-03 PROCEDURE — 99214 OFFICE O/P EST MOD 30 MIN: CPT | Mod: GC,25 | Performed by: OBSTETRICS & GYNECOLOGY

## 2024-09-03 PROCEDURE — 76819 FETAL BIOPHYS PROFIL W/O NST: CPT | Performed by: OBSTETRICS & GYNECOLOGY

## 2024-09-03 PROCEDURE — 76816 OB US FOLLOW-UP PER FETUS: CPT | Performed by: OBSTETRICS & GYNECOLOGY

## 2024-09-03 PROCEDURE — 76816 OB US FOLLOW-UP PER FETUS: CPT

## 2024-09-03 PROCEDURE — 82947 ASSAY GLUCOSE BLOOD QUANT: CPT | Performed by: OBSTETRICS & GYNECOLOGY

## 2024-09-03 PROCEDURE — 76819 FETAL BIOPHYS PROFIL W/O NST: CPT

## 2024-09-03 ASSESSMENT — PAIN - FUNCTIONAL ASSESSMENT: PAIN_FUNCTIONAL_ASSESSMENT: 0-10

## 2024-09-03 ASSESSMENT — PAIN SCALES - GENERAL: PAINLEVEL_OUTOF10: 0 - NO PAIN

## 2024-09-03 NOTE — ASSESSMENT & PLAN NOTE
- reviewed blood sugar log with patient now taking only insulin, still having elevated fastings & rare elevated dinner post prandials   - increase NPH to 15u at bedtime   - discussed dietary habits/changes to improve dinner post prandials vs starting meal time insulin. Patient agreeable to diet changes   - Weekly BPP for  testing   - RTC in 2 weeks

## 2024-09-03 NOTE — PROGRESS NOTES
MF Follow-up  9/3/2024         SUBJECTIVE    HPI: Shravan Astudillo is a 23 y.o.  at 32w1d here for RPNV. Patient reports that she lost 7lbs over the last two weeks and is worried that this is harming her baby. She also is concerned about not feeling the baby move as much. She is otherwise feeling well. She reports normal fetal movement today. No contractions, VB or LOF.     Fasting sugars:   Lunch post prandial sugars: <120  Dinner post prandial sugars: 120-175    OBJECTIVE    Visit Vitals  /80   Pulse 80   Wt (!) 177 kg (390 lb 5 oz)   LMP 01/15/2024   BMI 61.13 kg/m²   OB Status Pregnant   Smoking Status Never   BSA 2.89 m²      FHT: to be assess on US     ASSESSMENT & PLAN    Shravan Astudillo is a 23 y.o.  at 32w1d here for the following concerns we addressed today:    Gestational diabetes mellitus (GDM) in third trimester ([facility])  - reviewed blood sugar log with patient now taking only insulin, still having elevated fastings & rare elevated dinner post prandials   - increase NPH to 15u at bedtime   - discussed dietary habits/changes to improve dinner post prandials vs starting meal time insulin. Patient agreeable to diet changes   - Weekly BPP for  testing   - RTC in 2 weeks     Pre-existing essential hypertension during pregnancy in third trimester ([facility])  - BP normotensive today   - Home BP readings <140/<90   - Continue to monitor for s/s of PreE       Orders Placed This Encounter   Procedures    POCT GLUCOSE       RTC in 2 weeks    Patient was seen and examined with Dr. Lui.     Kathryn Zuñiga MD

## 2024-09-06 ENCOUNTER — PROCEDURE VISIT (OUTPATIENT)
Dept: OBSTETRICS AND GYNECOLOGY | Facility: CLINIC | Age: 24
End: 2024-09-06
Payer: COMMERCIAL

## 2024-09-06 ENCOUNTER — HOSPITAL ENCOUNTER (OUTPATIENT)
Dept: RADIOLOGY | Facility: CLINIC | Age: 24
Discharge: HOME | End: 2024-09-06
Payer: COMMERCIAL

## 2024-09-06 VITALS — SYSTOLIC BLOOD PRESSURE: 148 MMHG | DIASTOLIC BLOOD PRESSURE: 88 MMHG | WEIGHT: 293 LBS | BODY MASS INDEX: 60.99 KG/M2

## 2024-09-06 DIAGNOSIS — G56.00 CARPAL TUNNEL SYNDROME DURING PREGNANCY (HHS-HCC): ICD-10-CM

## 2024-09-06 DIAGNOSIS — Z3A.30 30 WEEKS GESTATION OF PREGNANCY (HHS-HCC): ICD-10-CM

## 2024-09-06 DIAGNOSIS — O10.011 PRE-EXISTING ESSENTIAL HYPERTENSION DURING PREGNANCY IN FIRST TRIMESTER (HHS-HCC): ICD-10-CM

## 2024-09-06 DIAGNOSIS — O26.899 CARPAL TUNNEL SYNDROME DURING PREGNANCY (HHS-HCC): ICD-10-CM

## 2024-09-06 DIAGNOSIS — Z3A.17 17 WEEKS GESTATION OF PREGNANCY (HHS-HCC): ICD-10-CM

## 2024-09-06 DIAGNOSIS — O23.42 URINARY TRACT INFECTION IN MOTHER DURING SECOND TRIMESTER OF PREGNANCY (HHS-HCC): ICD-10-CM

## 2024-09-06 DIAGNOSIS — O99.213 OBESITY AFFECTING PREGNANCY IN THIRD TRIMESTER, UNSPECIFIED OBESITY TYPE (HHS-HCC): ICD-10-CM

## 2024-09-06 DIAGNOSIS — K21.9 GASTROESOPHAGEAL REFLUX DURING PREGNANCY IN SECOND TRIMESTER, ANTEPARTUM (HHS-HCC): ICD-10-CM

## 2024-09-06 DIAGNOSIS — O99.612 GASTROESOPHAGEAL REFLUX DURING PREGNANCY IN SECOND TRIMESTER, ANTEPARTUM (HHS-HCC): ICD-10-CM

## 2024-09-06 DIAGNOSIS — K59.00 CONSTIPATION IN PREGNANCY IN SECOND TRIMESTER (HHS-HCC): ICD-10-CM

## 2024-09-06 DIAGNOSIS — O10.013 PRE-EXISTING ESSENTIAL HYPERTENSION DURING PREGNANCY IN THIRD TRIMESTER (HHS-HCC): ICD-10-CM

## 2024-09-06 DIAGNOSIS — F32.A DEPRESSION AFFECTING PREGNANCY IN THIRD TRIMESTER, ANTEPARTUM (HHS-HCC): ICD-10-CM

## 2024-09-06 DIAGNOSIS — G47.9 DIFFICULTY SLEEPING: ICD-10-CM

## 2024-09-06 DIAGNOSIS — O99.612 CONSTIPATION IN PREGNANCY IN SECOND TRIMESTER (HHS-HCC): ICD-10-CM

## 2024-09-06 DIAGNOSIS — O99.343 DEPRESSION AFFECTING PREGNANCY IN THIRD TRIMESTER, ANTEPARTUM (HHS-HCC): ICD-10-CM

## 2024-09-06 PROCEDURE — 76815 OB US LIMITED FETUS(S): CPT

## 2024-09-06 PROCEDURE — 76819 FETAL BIOPHYS PROFIL W/O NST: CPT

## 2024-09-10 ENCOUNTER — PATIENT MESSAGE (OUTPATIENT)
Dept: MATERNAL FETAL MEDICINE | Facility: HOSPITAL | Age: 24
End: 2024-09-10
Payer: COMMERCIAL

## 2024-09-10 ENCOUNTER — HOSPITAL ENCOUNTER (OUTPATIENT)
Dept: RADIOLOGY | Facility: CLINIC | Age: 24
Discharge: HOME | End: 2024-09-10
Payer: COMMERCIAL

## 2024-09-10 DIAGNOSIS — Z3A.01 LESS THAN 8 WEEKS GESTATION OF PREGNANCY (HHS-HCC): ICD-10-CM

## 2024-09-10 PROCEDURE — 76819 FETAL BIOPHYS PROFIL W/O NST: CPT

## 2024-09-10 PROCEDURE — 76819 FETAL BIOPHYS PROFIL W/O NST: CPT | Performed by: STUDENT IN AN ORGANIZED HEALTH CARE EDUCATION/TRAINING PROGRAM

## 2024-09-10 NOTE — PATIENT COMMUNICATION
3  AM 93  L 108  D 106     4  AM 97  B 100  L 108  D 133     5  AM 98  L 109  D 130     6  Am 106  B 125  L 104  D 127     7  Am 101  B 95  L 103  D 109     8  Am 102  L 131  D 132     9  Am 101  L 106  D 107     10  Am 97  L 145 (tried new place for lunch, w rice base- too much rice in this meal)

## 2024-09-13 ENCOUNTER — PROCEDURE VISIT (OUTPATIENT)
Dept: OBSTETRICS AND GYNECOLOGY | Facility: CLINIC | Age: 24
End: 2024-09-13
Payer: COMMERCIAL

## 2024-09-13 VITALS
BODY MASS INDEX: 61.22 KG/M2 | DIASTOLIC BLOOD PRESSURE: 78 MMHG | WEIGHT: 293 LBS | HEART RATE: 91 BPM | SYSTOLIC BLOOD PRESSURE: 126 MMHG

## 2024-09-13 DIAGNOSIS — F32.A DEPRESSION AFFECTING PREGNANCY IN THIRD TRIMESTER, ANTEPARTUM (HHS-HCC): ICD-10-CM

## 2024-09-13 DIAGNOSIS — G47.9 DIFFICULTY SLEEPING: ICD-10-CM

## 2024-09-13 DIAGNOSIS — G56.00 CARPAL TUNNEL SYNDROME DURING PREGNANCY (HHS-HCC): ICD-10-CM

## 2024-09-13 DIAGNOSIS — O26.899 CARPAL TUNNEL SYNDROME DURING PREGNANCY (HHS-HCC): ICD-10-CM

## 2024-09-13 DIAGNOSIS — O99.343 DEPRESSION AFFECTING PREGNANCY IN THIRD TRIMESTER, ANTEPARTUM (HHS-HCC): ICD-10-CM

## 2024-09-13 DIAGNOSIS — O10.013 PRE-EXISTING ESSENTIAL HYPERTENSION DURING PREGNANCY IN THIRD TRIMESTER (HHS-HCC): ICD-10-CM

## 2024-09-13 DIAGNOSIS — O23.42 URINARY TRACT INFECTION IN MOTHER DURING SECOND TRIMESTER OF PREGNANCY (HHS-HCC): ICD-10-CM

## 2024-09-13 DIAGNOSIS — Z3A.33 33 WEEKS GESTATION OF PREGNANCY (HHS-HCC): ICD-10-CM

## 2024-09-13 DIAGNOSIS — O24.414 INSULIN CONTROLLED GESTATIONAL DIABETES MELLITUS (GDM) IN THIRD TRIMESTER (HHS-HCC): Primary | ICD-10-CM

## 2024-09-13 PROCEDURE — 59025 FETAL NON-STRESS TEST: CPT | Performed by: STUDENT IN AN ORGANIZED HEALTH CARE EDUCATION/TRAINING PROGRAM

## 2024-09-13 RX ORDER — BLOOD SUGAR DIAGNOSTIC
STRIP MISCELLANEOUS
Qty: 150 EACH | Refills: 3 | Status: SHIPPED | OUTPATIENT
Start: 2024-09-13

## 2024-09-13 RX ORDER — LANCETS
EACH MISCELLANEOUS
Qty: 200 EACH | Refills: 3 | Status: SHIPPED | OUTPATIENT
Start: 2024-09-13

## 2024-09-13 ASSESSMENT — PAIN - FUNCTIONAL ASSESSMENT: PAIN_FUNCTIONAL_ASSESSMENT: 0-10

## 2024-09-13 ASSESSMENT — PAIN SCALES - GENERAL: PAINLEVEL_OUTOF10: 0 - NO PAIN

## 2024-09-13 NOTE — SIGNIFICANT EVENT
09/13/24 1309   Non-Stress Test    Baseline Fetal Heart Rate for Non-Stress Test 130 BPM   Variability in Waveform for Non-Stress Test 6-25 BPM   Accelerations in Non-Stress Test Yes   Decelerations in Non-Stress Test None   Contractions in Non-Stress Test Not present   Interpretation of Non-Stress Test    Interpretation of Non-Stress Test Reactive     NST for GDMA2.    Alexandr Sommer MD  Maternal-Fetal Medicine

## 2024-09-17 ENCOUNTER — HOSPITAL ENCOUNTER (OUTPATIENT)
Dept: RADIOLOGY | Facility: CLINIC | Age: 24
Discharge: HOME | End: 2024-09-17
Payer: COMMERCIAL

## 2024-09-17 ENCOUNTER — ROUTINE PRENATAL (OUTPATIENT)
Dept: MATERNAL FETAL MEDICINE | Facility: CLINIC | Age: 24
End: 2024-09-17
Payer: COMMERCIAL

## 2024-09-17 VITALS — DIASTOLIC BLOOD PRESSURE: 78 MMHG | SYSTOLIC BLOOD PRESSURE: 141 MMHG | WEIGHT: 293 LBS | BODY MASS INDEX: 61.66 KG/M2

## 2024-09-17 DIAGNOSIS — Z3A.01 LESS THAN 8 WEEKS GESTATION OF PREGNANCY (HHS-HCC): ICD-10-CM

## 2024-09-17 DIAGNOSIS — K59.00 CONSTIPATION IN PREGNANCY IN SECOND TRIMESTER (HHS-HCC): ICD-10-CM

## 2024-09-17 DIAGNOSIS — O24.414 INSULIN CONTROLLED GESTATIONAL DIABETES MELLITUS (GDM) IN THIRD TRIMESTER (HHS-HCC): ICD-10-CM

## 2024-09-17 DIAGNOSIS — O10.013 PRE-EXISTING ESSENTIAL HYPERTENSION DURING PREGNANCY IN THIRD TRIMESTER (HHS-HCC): ICD-10-CM

## 2024-09-17 DIAGNOSIS — K21.9 GASTROESOPHAGEAL REFLUX DURING PREGNANCY IN SECOND TRIMESTER, ANTEPARTUM (HHS-HCC): ICD-10-CM

## 2024-09-17 DIAGNOSIS — O24.419 GESTATIONAL DIABETES MELLITUS (GDM): ICD-10-CM

## 2024-09-17 DIAGNOSIS — M54.31 BILATERAL SCIATICA: ICD-10-CM

## 2024-09-17 DIAGNOSIS — G47.9 DIFFICULTY SLEEPING: ICD-10-CM

## 2024-09-17 DIAGNOSIS — Z3A.34 34 WEEKS GESTATION OF PREGNANCY (HHS-HCC): Primary | ICD-10-CM

## 2024-09-17 DIAGNOSIS — M54.32 BILATERAL SCIATICA: ICD-10-CM

## 2024-09-17 DIAGNOSIS — O99.343 DEPRESSION AFFECTING PREGNANCY IN THIRD TRIMESTER, ANTEPARTUM (HHS-HCC): ICD-10-CM

## 2024-09-17 DIAGNOSIS — O23.42 URINARY TRACT INFECTION IN MOTHER DURING SECOND TRIMESTER OF PREGNANCY (HHS-HCC): ICD-10-CM

## 2024-09-17 DIAGNOSIS — G56.00 CARPAL TUNNEL SYNDROME DURING PREGNANCY (HHS-HCC): ICD-10-CM

## 2024-09-17 DIAGNOSIS — O99.213 OBESITY AFFECTING PREGNANCY IN THIRD TRIMESTER, UNSPECIFIED OBESITY TYPE (HHS-HCC): ICD-10-CM

## 2024-09-17 DIAGNOSIS — F32.A DEPRESSION AFFECTING PREGNANCY IN THIRD TRIMESTER, ANTEPARTUM (HHS-HCC): ICD-10-CM

## 2024-09-17 DIAGNOSIS — O10.012 PRE-EXISTING ESSENTIAL HYPERTENSION DURING PREGNANCY IN SECOND TRIMESTER (HHS-HCC): ICD-10-CM

## 2024-09-17 DIAGNOSIS — O26.899 CARPAL TUNNEL SYNDROME DURING PREGNANCY (HHS-HCC): ICD-10-CM

## 2024-09-17 DIAGNOSIS — O99.612 CONSTIPATION IN PREGNANCY IN SECOND TRIMESTER (HHS-HCC): ICD-10-CM

## 2024-09-17 DIAGNOSIS — O99.612 GASTROESOPHAGEAL REFLUX DURING PREGNANCY IN SECOND TRIMESTER, ANTEPARTUM (HHS-HCC): ICD-10-CM

## 2024-09-17 LAB — GLUCOSE BLD MANUAL STRIP-MCNC: 115 MG/DL (ref 74–99)

## 2024-09-17 PROCEDURE — 76819 FETAL BIOPHYS PROFIL W/O NST: CPT

## 2024-09-17 PROCEDURE — 76815 OB US LIMITED FETUS(S): CPT

## 2024-09-17 PROCEDURE — 76815 OB US LIMITED FETUS(S): CPT | Performed by: OBSTETRICS & GYNECOLOGY

## 2024-09-17 PROCEDURE — 76819 FETAL BIOPHYS PROFIL W/O NST: CPT | Performed by: OBSTETRICS & GYNECOLOGY

## 2024-09-17 PROCEDURE — 82947 ASSAY GLUCOSE BLOOD QUANT: CPT | Performed by: OBSTETRICS & GYNECOLOGY

## 2024-09-17 PROCEDURE — 99214 OFFICE O/P EST MOD 30 MIN: CPT | Mod: GC,25 | Performed by: OBSTETRICS & GYNECOLOGY

## 2024-09-17 RX ORDER — LABETALOL 200 MG/1
400 TABLET, FILM COATED ORAL 3 TIMES DAILY
Qty: 540 TABLET | Refills: 3 | Status: SHIPPED | OUTPATIENT
Start: 2024-09-17 | End: 2025-09-17

## 2024-09-17 ASSESSMENT — ENCOUNTER SYMPTOMS
ALLERGIC/IMMUNOLOGIC NEGATIVE: 0
EYES NEGATIVE: 0
HEMATOLOGIC/LYMPHATIC NEGATIVE: 0
GASTROINTESTINAL NEGATIVE: 0
NEUROLOGICAL NEGATIVE: 0
CONSTITUTIONAL NEGATIVE: 0
PSYCHIATRIC NEGATIVE: 0
MUSCULOSKELETAL NEGATIVE: 0
ENDOCRINE NEGATIVE: 0
CARDIOVASCULAR NEGATIVE: 0
RESPIRATORY NEGATIVE: 0

## 2024-09-17 ASSESSMENT — PATIENT HEALTH QUESTIONNAIRE - PHQ9
SUM OF ALL RESPONSES TO PHQ9 QUESTIONS 1 AND 2: 0
1. LITTLE INTEREST OR PLEASURE IN DOING THINGS: NOT AT ALL
2. FEELING DOWN, DEPRESSED OR HOPELESS: NOT AT ALL

## 2024-09-17 ASSESSMENT — COLUMBIA-SUICIDE SEVERITY RATING SCALE - C-SSRS
1. IN THE PAST MONTH, HAVE YOU WISHED YOU WERE DEAD OR WISHED YOU COULD GO TO SLEEP AND NOT WAKE UP?: NO
2. HAVE YOU ACTUALLY HAD ANY THOUGHTS OF KILLING YOURSELF?: NO
6. HAVE YOU EVER DONE ANYTHING, STARTED TO DO ANYTHING, OR PREPARED TO DO ANYTHING TO END YOUR LIFE?: NO

## 2024-09-17 NOTE — PROGRESS NOTES
MFM Follow-up  24     SUBJECTIVE    HPI: Shravan Astudillo is a 23 y.o.  at 34w1d here for RPNV. Denies contractions, bleeding, or LOF. Reports normal fetal movement. Patient reports rare [place] only when she is active. Mild HA intermittently that are resolving without medications. Denies CP, SOB, RUQ pain and vision changes. Otherwise doing well, no other complaints at this time.      Other pregnancy complications include    Patient Active Problem List   Diagnosis    Attention deficit hyperactivity disorder (ADHD)    Depression affecting pregnancy in third trimester, antepartum ([facility])    Gastroesophageal reflux during pregnancy in second trimester, antepartum ([facility])    Obesity affecting pregnancy in third trimester ([facility])    Pre-existing essential hypertension during pregnancy in third trimester ([facility])    34 weeks gestation of pregnancy ([facility])    Carpal tunnel syndrome during pregnancy ([facility])    Sciatica    Urinary tract infection in mother during second trimester of pregnancy ([facility])    Constipation in pregnancy in second trimester ([facility])    Gestational diabetes mellitus (GDM) in third trimester ([facility])    Difficulty sleeping       OBJECTIVE  Visit Vitals  /78   Wt (!) 179 kg (393 lb 11.2 oz)   LMP 01/15/2024   BMI 61.66 kg/m²   OB Status Pregnant   Smoking Status Never   BSA 2.91 m²      FHT: 130 on BPP today     ASSESSMENT & PLAN    Shravan Astudillo is a 23 y.o.  at 34w1d here for the following concerns we addressed today:    Gestational diabetes mellitus (GDM) in third trimester ([facility])  POCT  today: 115  Blood sugars reviewed:  Fastin-110 (5/7 abnormal)  Breakfast:  (0/3 abnormal)  Lunch: 103-154 (2/5 abnormal)  Dinner:  (1/7 abnormal)  Currently managed on: NPH 20u bedtime  Regimen changed to NPH 30 nightly    One low BG after dinner 82, pt had symptoms, resolved with snack and did not recheck. [place] sweaty yesterday, did not eat until  3pm. Discussed with patient importance of eating a little bit throughout the day.       Pre-existing essential hypertension during pregnancy in third trimester ([facility])  Pt reports taking labetalol 400mg TID and bASA.   Pt with BP log today, 141/78 in clinic pt thinks she forgot AM meds. Log completely normotensive, 120-130's/70-low 80's.    Gastroesophageal reflux during pregnancy in second trimester, antepartum ([facility])  Pt reports no reflex today. Well controlled on pantoprazole.     Constipation in pregnancy in second trimester ([facility])  Pt reports she has been doing well from this standpoint without meds.     34 weeks gestation of pregnancy ([facility])  RSV administered today.   Pt had questions about baby being breech on US, discussed option of ECV at time of delivery if baby is still breech. She is not interested in ECV at this time. She is hoping to go forward with vaginal delivery, but would prefer pLTCS over ECV at time of delivery.     Depression affecting pregnancy in third trimester, antepartum ([facility])  Pt on no meds, reports good mood today. No concerns and no need for further action at this time. Will continue to monitor.        Orders Placed This Encounter   Procedures    POCT GLUCOSE     Order Specific Question:   Release result to MyChart     Answer:   Immediate        RTC in 1 week    Patient seen and evaluated with Dr. Hu Mina MD PGY-1

## 2024-09-17 NOTE — ASSESSMENT & PLAN NOTE
RSV administered today.   Pt had questions about baby being breech on US, discussed option of ECV at time of delivery if baby is still breech. She is not interested in ECV at this time. She is hoping to go forward with vaginal delivery, but would prefer pLTCS over ECV at time of delivery.

## 2024-09-17 NOTE — ASSESSMENT & PLAN NOTE
Pt on no meds, reports good mood today. No concerns and no need for further action at this time. Will continue to monitor.

## 2024-09-17 NOTE — PROGRESS NOTES
(RSV, vaccine per provider)  Given IM into left deltoid  Supplied by office  Patient tolerated well  VIS given     LOT #: [id number]  Expiration date: 10/1/2025

## 2024-09-17 NOTE — ASSESSMENT & PLAN NOTE
Pt reports taking labetalol 400mg TID and bASA.   Pt with BP log today, 141/78 in clinic pt thinks she forgot AM meds. Log completely normotensive, 120-130's/70-low 80's.

## 2024-09-17 NOTE — ASSESSMENT & PLAN NOTE
POCT  today: 115  Blood sugars reviewed:  Fastin-110 (5/7 abnormal)  Breakfast:  (0/3 abnormal)  Lunch: 103-154 (2/5 abnormal)  Dinner:  (1/7 abnormal)  Currently managed on: NPH 20u bedtime  Regimen changed to NPH 30 nightly    One low BG after dinner 82, pt had symptoms, resolved with snack and did not recheck. [place] sweaty yesterday, did not eat until 3pm. Discussed with patient importance of eating a little bit throughout the day.

## 2024-09-20 ENCOUNTER — HOSPITAL ENCOUNTER (OUTPATIENT)
Dept: RADIOLOGY | Facility: CLINIC | Age: 24
Discharge: HOME | End: 2024-09-20
Payer: COMMERCIAL

## 2024-09-20 ENCOUNTER — PROCEDURE VISIT (OUTPATIENT)
Dept: OBSTETRICS AND GYNECOLOGY | Facility: CLINIC | Age: 24
End: 2024-09-20
Payer: COMMERCIAL

## 2024-09-20 VITALS
HEART RATE: 61 BPM | SYSTOLIC BLOOD PRESSURE: 119 MMHG | DIASTOLIC BLOOD PRESSURE: 71 MMHG | BODY MASS INDEX: 61.4 KG/M2 | WEIGHT: 293 LBS

## 2024-09-20 DIAGNOSIS — O99.612 CONSTIPATION IN PREGNANCY IN SECOND TRIMESTER (HHS-HCC): ICD-10-CM

## 2024-09-20 DIAGNOSIS — O99.213 OBESITY AFFECTING PREGNANCY IN THIRD TRIMESTER, UNSPECIFIED OBESITY TYPE (HHS-HCC): ICD-10-CM

## 2024-09-20 DIAGNOSIS — K59.00 CONSTIPATION IN PREGNANCY IN SECOND TRIMESTER (HHS-HCC): ICD-10-CM

## 2024-09-20 DIAGNOSIS — Z3A.34 34 WEEKS GESTATION OF PREGNANCY (HHS-HCC): ICD-10-CM

## 2024-09-20 DIAGNOSIS — O26.899 CARPAL TUNNEL SYNDROME DURING PREGNANCY (HHS-HCC): ICD-10-CM

## 2024-09-20 DIAGNOSIS — G56.00 CARPAL TUNNEL SYNDROME DURING PREGNANCY (HHS-HCC): ICD-10-CM

## 2024-09-20 DIAGNOSIS — K21.9 GASTROESOPHAGEAL REFLUX DURING PREGNANCY IN SECOND TRIMESTER, ANTEPARTUM (HHS-HCC): ICD-10-CM

## 2024-09-20 DIAGNOSIS — Z3A.01 LESS THAN 8 WEEKS GESTATION OF PREGNANCY (HHS-HCC): ICD-10-CM

## 2024-09-20 DIAGNOSIS — O10.013 PRE-EXISTING ESSENTIAL HYPERTENSION DURING PREGNANCY IN THIRD TRIMESTER (HHS-HCC): ICD-10-CM

## 2024-09-20 DIAGNOSIS — G47.9 DIFFICULTY SLEEPING: ICD-10-CM

## 2024-09-20 DIAGNOSIS — O99.612 GASTROESOPHAGEAL REFLUX DURING PREGNANCY IN SECOND TRIMESTER, ANTEPARTUM (HHS-HCC): ICD-10-CM

## 2024-09-20 DIAGNOSIS — O24.414 INSULIN CONTROLLED GESTATIONAL DIABETES MELLITUS (GDM) IN THIRD TRIMESTER (HHS-HCC): ICD-10-CM

## 2024-09-20 DIAGNOSIS — O99.343 DEPRESSION AFFECTING PREGNANCY IN THIRD TRIMESTER, ANTEPARTUM (HHS-HCC): ICD-10-CM

## 2024-09-20 DIAGNOSIS — F32.A DEPRESSION AFFECTING PREGNANCY IN THIRD TRIMESTER, ANTEPARTUM (HHS-HCC): ICD-10-CM

## 2024-09-20 DIAGNOSIS — O36.5930 MATERNAL CARE FOR OTHER KNOWN OR SUSPECTED POOR FETAL GROWTH, THIRD TRIMESTER, NOT APPLICABLE OR UNSPECIFIED: ICD-10-CM

## 2024-09-20 DIAGNOSIS — O23.42 URINARY TRACT INFECTION IN MOTHER DURING SECOND TRIMESTER OF PREGNANCY (HHS-HCC): ICD-10-CM

## 2024-09-20 LAB
GLUCOSE BLD MANUAL STRIP-MCNC: 99 MG/DL (ref 74–99)
POC FINGERSTICK BLOOD GLUCOSE: 99 MG/DL (ref 70–100)

## 2024-09-20 PROCEDURE — 82962 GLUCOSE BLOOD TEST: CPT

## 2024-09-20 PROCEDURE — 82962 GLUCOSE BLOOD TEST: CPT | Performed by: OBSTETRICS & GYNECOLOGY

## 2024-09-20 PROCEDURE — 76815 OB US LIMITED FETUS(S): CPT

## 2024-09-20 PROCEDURE — 76819 FETAL BIOPHYS PROFIL W/O NST: CPT

## 2024-09-20 PROCEDURE — 82947 ASSAY GLUCOSE BLOOD QUANT: CPT | Mod: 59

## 2024-09-20 NOTE — PROGRESS NOTES
Unable to complete NST - pt states she feels like she got light headed and dizzy while lying flat.  Pt sat up cool wash cloth to back of neck  and pt able to drink water. VS stable 119/71  pulse 61  BS 99  Pt reports she took insulin last night about 9 pm and did not eat this AM.  Pt felt better after a few minutes and able to walk without difficulty - for BPP per Dr Delatorre

## 2024-09-22 PROBLEM — Z3A.35 35 WEEKS GESTATION OF PREGNANCY (HHS-HCC): Status: ACTIVE | Noted: 2024-02-29
